# Patient Record
Sex: FEMALE | Race: WHITE | Employment: FULL TIME | URBAN - METROPOLITAN AREA
[De-identification: names, ages, dates, MRNs, and addresses within clinical notes are randomized per-mention and may not be internally consistent; named-entity substitution may affect disease eponyms.]

---

## 2021-04-05 ENCOUNTER — OFFICE VISIT (OUTPATIENT)
Dept: FAMILY MEDICINE CLINIC | Age: 31
End: 2021-04-05
Payer: COMMERCIAL

## 2021-04-05 VITALS
RESPIRATION RATE: 20 BRPM | SYSTOLIC BLOOD PRESSURE: 110 MMHG | HEIGHT: 64 IN | TEMPERATURE: 97.9 F | DIASTOLIC BLOOD PRESSURE: 64 MMHG | BODY MASS INDEX: 24.04 KG/M2 | OXYGEN SATURATION: 99 % | WEIGHT: 140.8 LBS | HEART RATE: 100 BPM

## 2021-04-05 DIAGNOSIS — L40.50 PSORIATIC ARTHRITIS (HCC): ICD-10-CM

## 2021-04-05 DIAGNOSIS — Z76.89 ENCOUNTER TO ESTABLISH CARE: Primary | ICD-10-CM

## 2021-04-05 DIAGNOSIS — G43.009 MIGRAINE WITHOUT AURA AND WITHOUT STATUS MIGRAINOSUS, NOT INTRACTABLE: ICD-10-CM

## 2021-04-05 DIAGNOSIS — L40.9 PSORIASIS: ICD-10-CM

## 2021-04-05 PROBLEM — M76.61 RIGHT ACHILLES TENDINITIS: Status: ACTIVE | Noted: 2021-04-05

## 2021-04-05 PROCEDURE — 99204 OFFICE O/P NEW MOD 45 MIN: CPT | Performed by: NURSE PRACTITIONER

## 2021-04-05 RX ORDER — SECUKINUMAB 150 MG/ML
INJECTION SUBCUTANEOUS
COMMUNITY
Start: 2021-04-01

## 2021-04-05 RX ORDER — FOLIC ACID 1 MG/1
1 TABLET ORAL DAILY
COMMUNITY
Start: 2021-03-08

## 2021-04-05 RX ORDER — BUTALBITAL, ACETAMINOPHEN AND CAFFEINE 50; 325; 40 MG/1; MG/1; MG/1
1 TABLET ORAL EVERY 6 HOURS PRN
Qty: 180 TABLET | Refills: 1 | Status: SHIPPED | OUTPATIENT
Start: 2021-04-05 | End: 2021-06-06 | Stop reason: ALTCHOICE

## 2021-04-05 RX ORDER — RIZATRIPTAN BENZOATE 5 MG/1
5 TABLET ORAL
Qty: 30 TABLET | Refills: 3 | Status: SHIPPED | OUTPATIENT
Start: 2021-04-05 | End: 2021-06-04 | Stop reason: ALTCHOICE

## 2021-04-05 SDOH — ECONOMIC STABILITY: TRANSPORTATION INSECURITY
IN THE PAST 12 MONTHS, HAS LACK OF TRANSPORTATION KEPT YOU FROM MEETINGS, WORK, OR FROM GETTING THINGS NEEDED FOR DAILY LIVING?: NO

## 2021-04-05 SDOH — HEALTH STABILITY: MENTAL HEALTH: HOW OFTEN DO YOU HAVE A DRINK CONTAINING ALCOHOL?: MONTHLY OR LESS

## 2021-04-05 ASSESSMENT — ENCOUNTER SYMPTOMS
ABDOMINAL PAIN: 0
VOMITING: 1
SHORTNESS OF BREATH: 0
NAUSEA: 1
PHOTOPHOBIA: 1
BLURRED VISION: 0
RESPIRATORY NEGATIVE: 1
DIARRHEA: 0
CONSTIPATION: 0
COUGH: 0

## 2021-04-05 ASSESSMENT — PATIENT HEALTH QUESTIONNAIRE - PHQ9
1. LITTLE INTEREST OR PLEASURE IN DOING THINGS: 0
SUM OF ALL RESPONSES TO PHQ QUESTIONS 1-9: 0
SUM OF ALL RESPONSES TO PHQ QUESTIONS 1-9: 0

## 2021-04-05 NOTE — PROGRESS NOTES
Depression screening done  Chief Complaint   Patient presents with    New Patient    Migraine     4-5 times a week

## 2021-04-05 NOTE — PROGRESS NOTES
[facility]  [facility]  [address]  [facility] [address]  Dept: [phone]    4/5/2021    CHIEF COMPLAINT    Chief Complaint   Patient presents with    New Patient    Migraine     4-5 times a week       HPI    Michelle Olson is a 28 y.o. female who presents   Chief Complaint   Patient presents with    New Patient    Migraine     4-5 times a week   . Appointment to establish care. Migraines- She notes that she has 4-5 headaches or migraines per week. She notes that she has taken ibuprofen and Advil migraine have been helpful. Excedrin and tylenol have been ineffective. If it's not she will try to rest.   She has had her wisdom teeth taken out in hopes that this would help. She did this 3-4 months ago with no improvement. Notable history of epidural in 2009. She is unclear when they increased in frequency after her epidural.   Will be pursuing FMLA for migraines. Psoriasis and Psoriatic Arthritis- Seeing [facility]. She is taking methotrexate and Cosentyx     Migraine   This is a chronic problem. The current episode started more than 1 year ago. The problem occurs intermittently. The problem has been gradually worsening. The pain is located in the right unilateral and retro-orbital (at times left temportal is affected, but not often ) region. The pain radiates to the right neck. The quality of the pain is described as throbbing and pulsating. Associated symptoms include nausea, photophobia and vomiting. Pertinent negatives include no abdominal pain, blurred vision, coughing, dizziness, fever or phonophobia. The symptoms are aggravated by emotional stress, bright light and activity. She has tried acetaminophen, darkened room and Excedrin for the symptoms. The treatment provided no relief. Her past medical history is significant for migraine headaches and migraines in the family.          Vitals:    04/05/21 0853   BP: 110/64   Site: Left Upper Arm   Position: Sitting   Cuff Size: Medium Adult   Pulse: 100   Resp: 20   Temp: 97.9 °F (36.6 °C)   TempSrc: Temporal   SpO2: 99%   Weight: 140 lb 12.8 oz (63.9 kg)   Height: 5' 4\" (1.626 m)       Wt Readings from Last 3 Encounters:   04/05/21 140 lb 12.8 oz (63.9 kg)     BP Readings from Last 3 Encounters:   04/05/21 110/64       REVIEW OF SYSTEMS    Review of Systems   Constitutional: Negative. Negative for chills, fatigue and fever. Eyes: Positive for photophobia. Negative for blurred vision. Respiratory: Negative. Negative for cough and shortness of breath. Cardiovascular: Negative. Negative for chest pain and palpitations. Gastrointestinal: Positive for nausea and vomiting. Negative for abdominal pain, constipation and diarrhea. Genitourinary: Negative. Musculoskeletal: Negative. Neurological: Positive for headaches (See HPI ). Negative for dizziness. Psychiatric/Behavioral: Negative. Negative for dysphoric mood. The patient is not nervous/anxious. PAST MEDICAL HISTORY    History reviewed. No pertinent past medical history.     FAMILY HISTORY    Family History   Problem Relation Age of Onset    Cervical Cancer Mother     Anxiety Disorder Mother     Psoriasis Mother     Diabetes type 2  Father    [place] Migraines Father     High Cholesterol Father    [place]go Migraines Sister     Psoriasis Sister     Anxiety Disorder Sister     No Known Problems Brother     Migraines Maternal Grandmother     Heart Surgery Maternal Grandmother         heart valve replacement     Diabetes Maternal Grandmother     Kidney Disease Maternal Grandmother         on HD     Other Maternal Grandmother         DVT in arms     Heart Attack Maternal Grandfather 72        COD     No Known Problems Paternal Grandmother         unknown health history    No Known Problems Paternal Grandfather         unknown health history        SOCIAL HISTORY    Social History     Socioeconomic History    Marital status: Single     Spouse name: None    Number of children: 3    Years of education: None    Highest education level: None   Occupational History    None   Social Needs    Financial resource strain: Not hard at all   Oleg-Lyndsey insecurity     Worry: Never true     Inability: Never true   [employer] needs     Medical: No     Non-medical: No   Tobacco Use    Smoking status: Never Smoker    Smokeless tobacco: Never Used   Substance and Sexual Activity    Alcohol use: Yes     Frequency: Monthly or less     Drinks per session: 1 or 2     Binge frequency: Never    Drug use: Never    Sexual activity: Yes     Partners: Male     Birth control/protection: Surgical   Lifestyle    Physical activity     Days per week: None     Minutes per session: None    Stress: None   Relationships    Social connections     Talks on phone: None     Gets together: None     Attends Yazidism service: None     Active member of club or organization: None     Attends meetings of clubs or organizations: None     Relationship status: None    Intimate partner violence     Fear of current or ex partner: None     Emotionally abused: None     Physically abused: None     Forced sexual activity: None   Other Topics Concern    None   Social History Narrative    None       SURGICAL HISTORY    Past Surgical History:   Procedure Laterality Date    TUBAL LIGATION  2016    WISDOM TOOTH EXTRACTION         CURRENT MEDICATIONS    Current Outpatient Medications   Medication Sig Dispense Refill    folic acid (FOLVITE) 1 MG tablet Take 1 mg by mouth daily      methotrexate (RHEUMATREX) 2.5 MG chemo tablet take 4 tablets by mouth every week      secukinumab (COSENTYX SENSOREADY PEN) 150 MG/ML SOAJ INJECT TWO PENS SUBCUTANEOUSLY EVERY 4 WEEKS. REFRIGERATE. ALLOW 15 TO 30 MINUTES AT ROOM TEMP PRIOR TO ADMINISTRATION.       butalbital-acetaminophen-caffeine (FIORICET, ESGIC) -40 MG per tablet Take 1 tablet by mouth every 6 hours as needed for Headaches or Migraine 180 tablet 1    rizatriptan (MAXALT) 5 MG tablet Take 1 tablet by mouth once as needed for Migraine May repeat in 2 hours if needed. Max dose of 30 mg/daily 30 tablet 3     No current facility-administered medications for this visit. ALLERGIES    Allergies   Allergen Reactions    Sulfamethoxazole-Trimethoprim Hives and Swelling       PHYSICAL EXAM   Physical Exam  Vitals signs and nursing note reviewed. Constitutional:       General: She is not in acute distress. Appearance: She is well-developed. She is not diaphoretic. Interventions: Face mask in place. HENT:      Head: Normocephalic. Right Ear: Hearing normal.      Left Ear: Hearing normal.   Eyes:      General:         Right eye: No discharge. Left eye: No discharge. Pupils: Pupils are equal.   Neck:      Musculoskeletal: Normal range of motion. Cardiovascular:      Rate and Rhythm: Normal rate and regular rhythm. Pulses: Normal pulses. Radial pulses are 2+ on the right side and 2+ on the left side. Heart sounds: Normal heart sounds, S1 normal and S2 normal. No murmur. Pulmonary:      Effort: Pulmonary effort is normal. No respiratory distress. Breath sounds: Normal breath sounds. No wheezing. Skin:     General: Skin is warm and dry. Neurological:      Mental Status: She is alert and oriented to person, place, and time. Psychiatric:         Behavior: Behavior normal. Behavior is cooperative. ASSESSMENT/PLAN  1. Encounter to establish care  2. Psoriasis  Assessment & Plan:   Monitored by specialist- no acute findings meriting change in the plan. Continue taking methotrexate and Cosentyx per rheumatology  3. Psoriatic arthritis ([facility] [address]  Assessment & Plan:   Monitored by specialist- no acute findings meriting change in the plan. Continue taking methotrexate and Cosentyx per rheumatology  4.  Migraine without aura and without status pressure is normal. Treatment plan consists of No treatment change needed. No results found for: LDLCALC, LDLCHOLESTEROL, LDLDIRECT (goal LDL reduction with dx if diabetes is 50% LDL reduction)      PHQ Scores 4/5/2021   PHQ2 Score 0   PHQ9 Score 0     Interpretation of Total Score Depression Severity: 1-4 = Minimal depression, 5-9 = Mild depression, 10-14 = Moderate depression, 15-19 = Moderately severe depression, 20-27 = Severe depression     Return in about 2 months (around 6/5/2021) for migraines.     (Please note that portions of this note were completed with a voice recognition program. Efforts were made to edit the dictations but occasionally words are mis-transcribed.)      Electronically signed by DEMETRA Bustillo CNP on 4/5/21 at 9:04 AM CAROLINET

## 2021-04-23 ENCOUNTER — PATIENT MESSAGE (OUTPATIENT)
Dept: FAMILY MEDICINE CLINIC | Age: 31
End: 2021-04-23

## 2021-04-25 NOTE — ASSESSMENT & PLAN NOTE
Uncontrolled, changes made today: To include Fioricet and Maxalt for abortive therapy. Patient would like to hold off on any daily preventatives at this time. We will also hold off on neurology referral today. Encouraged to stay well-hydrated, avoid triggers and work towards getting restorative sleep.

## 2021-04-25 NOTE — ASSESSMENT & PLAN NOTE
Monitored by specialist- no acute findings meriting change in the plan.   Continue taking methotrexate and Cosentyx per rheumatology

## 2021-06-04 ENCOUNTER — OFFICE VISIT (OUTPATIENT)
Dept: FAMILY MEDICINE CLINIC | Age: 31
End: 2021-06-04
Payer: COMMERCIAL

## 2021-06-04 VITALS
HEART RATE: 79 BPM | OXYGEN SATURATION: 98 % | SYSTOLIC BLOOD PRESSURE: 114 MMHG | TEMPERATURE: 97.5 F | HEIGHT: 64 IN | DIASTOLIC BLOOD PRESSURE: 76 MMHG | RESPIRATION RATE: 18 BRPM | WEIGHT: 139 LBS | BODY MASS INDEX: 23.73 KG/M2

## 2021-06-04 DIAGNOSIS — G43.009 MIGRAINE WITHOUT AURA AND WITHOUT STATUS MIGRAINOSUS, NOT INTRACTABLE: Primary | ICD-10-CM

## 2021-06-04 PROCEDURE — 99213 OFFICE O/P EST LOW 20 MIN: CPT | Performed by: NURSE PRACTITIONER

## 2021-06-04 RX ORDER — RIZATRIPTAN BENZOATE 10 MG/1
10 TABLET ORAL
Qty: 30 TABLET | Refills: 3 | Status: SHIPPED | OUTPATIENT
Start: 2021-06-04 | End: 2021-10-19 | Stop reason: ALTCHOICE

## 2021-06-04 RX ORDER — PROPRANOLOL HYDROCHLORIDE 40 MG/1
40 TABLET ORAL 2 TIMES DAILY
Qty: 60 TABLET | Refills: 3 | Status: SHIPPED | OUTPATIENT
Start: 2021-06-04 | End: 2021-09-09 | Stop reason: SDUPTHER

## 2021-06-04 ASSESSMENT — PATIENT HEALTH QUESTIONNAIRE - PHQ9
SUM OF ALL RESPONSES TO PHQ9 QUESTIONS 1 & 2: 0
SUM OF ALL RESPONSES TO PHQ QUESTIONS 1-9: 0
1. LITTLE INTEREST OR PLEASURE IN DOING THINGS: 0
SUM OF ALL RESPONSES TO PHQ QUESTIONS 1-9: 0
2. FEELING DOWN, DEPRESSED OR HOPELESS: 0
SUM OF ALL RESPONSES TO PHQ QUESTIONS 1-9: 0

## 2021-06-04 ASSESSMENT — ENCOUNTER SYMPTOMS
VOMITING: 1
BLURRED VISION: 0
PHOTOPHOBIA: 1
NAUSEA: 1
ABDOMINAL PAIN: 0
COUGH: 0

## 2021-06-04 NOTE — PROGRESS NOTES
Maxalt ), fever or phonophobia. The symptoms are aggravated by emotional stress, bright light and activity. She has tried acetaminophen, darkened room and Excedrin for the symptoms. The treatment provided no relief. Her past medical history is significant for migraine headaches and migraines in the family. Vitals:    06/04/21 1131   BP: 114/76   Site: Left Upper Arm   Position: Sitting   Cuff Size: Medium Adult   Pulse: 79   Resp: 18   Temp: 97.5 °F (36.4 °C)   TempSrc: Temporal   SpO2: 98%   Weight: 139 lb (63 kg)   Height: 5' 4\" (1.626 m)       Wt Readings from Last 3 Encounters:   06/04/21 139 lb (63 kg)   04/05/21 140 lb 12.8 oz (63.9 kg)     BP Readings from Last 3 Encounters:   06/04/21 114/76   04/05/21 110/64       REVIEW OF SYSTEMS    Review of Systems   Constitutional: Negative. Negative for chills, fatigue and fever. Eyes: Positive for photophobia. Negative for blurred vision. Respiratory: Negative. Negative for cough and shortness of breath. Cardiovascular: Negative. Negative for chest pain and palpitations. Gastrointestinal: Positive for nausea and vomiting. Negative for abdominal pain, constipation and diarrhea. Genitourinary: Negative. Musculoskeletal: Negative. Neurological: Negative. Negative for dizziness ( With Maxalt ) and headaches. Psychiatric/Behavioral: Negative. Negative for dysphoric mood. The patient is not nervous/anxious. PAST MEDICAL HISTORY    No past medical history on file.     FAMILY HISTORY    Family History   Problem Relation Age of Onset    Cervical Cancer Mother     Anxiety Disorder Mother     Psoriasis Mother     Diabetes type 2  Father     Migraines Father     High Cholesterol Father    Orest Adama Migraines Sister     Psoriasis Sister     Anxiety Disorder Sister     No Known Problems Brother     Migraines Maternal Grandmother     Heart Surgery Maternal Grandmother         heart valve replacement     Diabetes Maternal Grandmother     LIGATION  2016    WISDOM TOOTH EXTRACTION         CURRENT MEDICATIONS    Current Outpatient Medications   Medication Sig Dispense Refill    rizatriptan (MAXALT) 10 MG tablet Take 1 tablet by mouth once as needed for Migraine May repeat in 2 hours if needed. Maximum of 30 mg/24 hours 30 tablet 3    propranolol (INDERAL) 40 MG tablet Take 1 tablet by mouth 2 times daily 60 tablet 3    folic acid (FOLVITE) 1 MG tablet Take 1 mg by mouth daily      methotrexate (RHEUMATREX) 2.5 MG chemo tablet take 4 tablets by mouth every week      secukinumab (COSENTYX SENSOREADY PEN) 150 MG/ML SOAJ INJECT TWO PENS SUBCUTANEOUSLY EVERY 4 WEEKS. REFRIGERATE. ALLOW 15 TO 30 MINUTES AT ROOM TEMP PRIOR TO ADMINISTRATION. No current facility-administered medications for this visit. ALLERGIES    Allergies   Allergen Reactions    Sulfamethoxazole-Trimethoprim Hives and Swelling       PHYSICAL EXAM   Physical Exam  Vitals and nursing note reviewed. Constitutional:       General: She is not in acute distress. Appearance: She is well-developed. She is not diaphoretic. Interventions: Face mask in place. HENT:      Head: Normocephalic. Right Ear: Hearing normal.      Left Ear: Hearing normal.   Eyes:      General:         Right eye: No discharge. Left eye: No discharge. Pupils: Pupils are equal.   Cardiovascular:      Rate and Rhythm: Normal rate and regular rhythm. Pulses: Normal pulses. Radial pulses are 2+ on the right side and 2+ on the left side. Heart sounds: Normal heart sounds, S1 normal and S2 normal. No murmur heard. Pulmonary:      Effort: Pulmonary effort is normal. No respiratory distress. Breath sounds: Normal breath sounds. No wheezing. Musculoskeletal:      Cervical back: Normal range of motion. Skin:     General: Skin is warm and dry. Neurological:      Mental Status: She is alert and oriented to person, place, and time.    Psychiatric: Behavior: Behavior normal. Behavior is cooperative. ASSESSMENT/PLAN  1. Migraine without aura and without status migrainosus, not intractable  Assessment & Plan:   Uncontrolled, changes made today: Continue Fioricet, add Inderal 40 mg daily with plans to titrate up to 40 mg twice daily and increase Maxalt from 5 mg at onset to 10 mg at onset with maximum of 30 mg in 24 hours. Headache diary provided to patient. Discussed importance of monitoring for triggers and avoiding them when possible. Extensive discussion about medications, side effects and possible need to adjust medications to find the right combination. Orders:  -     rizatriptan (MAXALT) 10 MG tablet; Take 1 tablet by mouth once as needed for Migraine May repeat in 2 hours if needed. Maximum of 30 mg/24 hours, Disp-30 tablet, R-3Normal  -     propranolol (INDERAL) 40 MG tablet; Take 1 tablet by mouth 2 times daily, Disp-60 tablet, R-3Normal       I have spent 20 minutes face to face with the patient more than 50 % if this time was spent counseling and coordinating care. Kaela received counseling on the following healthy behaviors: nutrition, exercise and medication adherence  Reviewed prior labs and health maintenance  Continue current medications, diet and exercise. Discussed use, benefit, and side effects of prescribed medications. Barriers to medication compliance addressed. Patient given educational materials - see patient instructions  Was a self-tracking handout given in paper form or via [employer]? Yes    Requested Prescriptions     Signed Prescriptions Disp Refills    rizatriptan (MAXALT) 10 MG tablet 30 tablet 3     Sig: Take 1 tablet by mouth once as needed for Migraine May repeat in 2 hours if needed. Maximum of 30 mg/24 hours    propranolol (INDERAL) 40 MG tablet 60 tablet 3     Sig: Take 1 tablet by mouth 2 times daily       All patient questions answered. Patient voiced understanding.     Quality Measures    Body mass index is 23.86 kg/m². Normal. Weight control planned discussed Healthy diet and regular exercise. BP: 114/76 Blood pressure is normal. Treatment plan consists of No treatment change needed. No results found for: LDLCALC, LDLCHOLESTEROL, LDLDIRECT (goal LDL reduction with dx if diabetes is 50% LDL reduction)      PHQ Scores 6/4/2021 4/5/2021   PHQ2 Score 0 0   PHQ9 Score 0 0     Interpretation of Total Score Depression Severity: 1-4 = Minimal depression, 5-9 = Mild depression, 10-14 = Moderate depression, 15-19 = Moderately severe depression, 20-27 = Severe depression     Return in about 7 weeks (around 7/23/2021) for migraines .     (Please note that portions of this note were completed with a voice recognition program. Efforts were made to edit the dictations but occasionally words are mis-transcribed.)      Electronically signed by DEMETRA Camacho CNP on 6/4/21 at 11:35 AM LEIDY

## 2021-06-06 ASSESSMENT — ENCOUNTER SYMPTOMS
RESPIRATORY NEGATIVE: 1
SHORTNESS OF BREATH: 0
DIARRHEA: 0
CONSTIPATION: 0

## 2021-06-06 NOTE — ASSESSMENT & PLAN NOTE
Uncontrolled, changes made today: Continue Fioricet, add Inderal 40 mg daily with plans to titrate up to 40 mg twice daily and increase Maxalt from 5 mg at onset to 10 mg at onset with maximum of 30 mg in 24 hours. Headache diary provided to patient. Discussed importance of monitoring for triggers and avoiding them when possible. Extensive discussion about medications, side effects and possible need to adjust medications to find the right combination.

## 2021-09-09 DIAGNOSIS — G43.009 MIGRAINE WITHOUT AURA AND WITHOUT STATUS MIGRAINOSUS, NOT INTRACTABLE: ICD-10-CM

## 2021-09-09 RX ORDER — PROPRANOLOL HYDROCHLORIDE 40 MG/1
40 TABLET ORAL 2 TIMES DAILY
Qty: 60 TABLET | Refills: 3 | Status: SHIPPED | OUTPATIENT
Start: 2021-09-09 | End: 2021-10-19

## 2021-10-19 ENCOUNTER — OFFICE VISIT (OUTPATIENT)
Dept: FAMILY MEDICINE CLINIC | Age: 31
End: 2021-10-19
Payer: COMMERCIAL

## 2021-10-19 VITALS
DIASTOLIC BLOOD PRESSURE: 60 MMHG | SYSTOLIC BLOOD PRESSURE: 88 MMHG | HEART RATE: 83 BPM | TEMPERATURE: 97 F | WEIGHT: 147.6 LBS | HEIGHT: 63 IN | BODY MASS INDEX: 26.15 KG/M2 | OXYGEN SATURATION: 98 %

## 2021-10-19 DIAGNOSIS — G43.009 MIGRAINE WITHOUT AURA AND WITHOUT STATUS MIGRAINOSUS, NOT INTRACTABLE: Primary | ICD-10-CM

## 2021-10-19 DIAGNOSIS — L40.50 PSORIATIC ARTHRITIS (HCC): ICD-10-CM

## 2021-10-19 DIAGNOSIS — L40.9 PSORIASIS: ICD-10-CM

## 2021-10-19 PROCEDURE — 99214 OFFICE O/P EST MOD 30 MIN: CPT | Performed by: NURSE PRACTITIONER

## 2021-10-19 RX ORDER — RIMEGEPANT SULFATE 75 MG/75MG
75 TABLET, ORALLY DISINTEGRATING ORAL PRN
Qty: 30 TABLET | Refills: 2 | Status: SHIPPED | OUTPATIENT
Start: 2021-10-19 | End: 2022-01-19 | Stop reason: SDUPTHER

## 2021-10-19 RX ORDER — RIMEGEPANT SULFATE 75 MG/75MG
75 TABLET, ORALLY DISINTEGRATING ORAL PRN
Qty: 30 TABLET | Refills: 2 | Status: SHIPPED | OUTPATIENT
Start: 2021-10-19 | End: 2021-10-19

## 2021-10-19 RX ORDER — RIMEGEPANT SULFATE 75 MG/75MG
75 TABLET, ORALLY DISINTEGRATING ORAL PRN
Qty: 30 TABLET | Refills: 2
Start: 2021-10-19 | End: 2022-01-19

## 2021-10-19 RX ORDER — PROPRANOLOL HYDROCHLORIDE 40 MG/1
20 TABLET ORAL DAILY
Qty: 60 TABLET | Refills: 3
Start: 2021-10-19 | End: 2022-01-19 | Stop reason: ALTCHOICE

## 2021-10-19 ASSESSMENT — ENCOUNTER SYMPTOMS
CONSTIPATION: 0
DIARRHEA: 0
VOMITING: 1
ABDOMINAL PAIN: 0
RESPIRATORY NEGATIVE: 1
SHORTNESS OF BREATH: 0
PHOTOPHOBIA: 1
NAUSEA: 1
BLURRED VISION: 0
COUGH: 0

## 2021-10-19 NOTE — ASSESSMENT & PLAN NOTE
Monitored by specialist- no acute findings meriting change in the plan.   Continue taking Cosentyx per rheumatology and establish with neurology as planned per their office request

## 2021-10-19 NOTE — ASSESSMENT & PLAN NOTE
Uncontrolled, changes made today: reduce Inderal to 20 mg from 40 daily due to fatigue and hypotension. Will d/c completely if fatigue continues or does not improve. Replace Maxalt with Nurtec ODT. Rx sent to pharmacy, [facility] and sample provided . Will fax copies of office notes to new neurology once patient provides name of provider.

## 2021-10-19 NOTE — PROGRESS NOTES
[facility]  [facility]  [address]  [facility] [address]  Dept: [phone]    10/19/2021    CHIEF COMPLAINT    Chief Complaint   Patient presents with    Migraine       HPI    Linda Meza is a 28 y.o. female who presents   Chief Complaint   Patient presents with    Migraine   . Appointment for follow-up on migraines. Migraines-at last appointment Inderal 40 mg was added with plans to titrate up to 40 mg twice daily. Maxalt was increased to 10 mg at onset with max of 30 in 24 hours. Patient provided headache diary to document history. TODAY- today noting headaches or migraines per week that are occurring roughly 2-3 times per week. She is feeling extra tired since starting the inderal. She continues only taking the Inderal once daily due to forgetting to take the second dose. She does have a BP of 88/60 in the office today. She feels that she is getting more headaches when she drinks more water. She is hardly drinking any water per day at this time for this reason. At work she drinks more water. Maxalt increase helped 50 % of the time, but she needed to be able to nap after to provide improvement in her symptoms. She had previously experienced dizziness that was improved with taking the medication with food.      Migraine history:  She tried the fioricet which was ineffective. She notes that she has taken ibuprofen and Advil migraine have been helpful. Excedrin and tylenol have been ineffective. She has had her wisdom teeth taken out in hopes that this would help. She did this 3-4 months ago with no improvement. Notable history of epidural in 2009. She is unclear when they increased in frequency after her epidural.   Will be pursuing FMLA for migraines.      History of psoriatic arthritis- Seeing Dr. Mady Sam at [facility] She was previously seen by  Seeing Dr. Young Chaidez with [facility] rheumatology.    She continues taking folic acid 1 mg daily and Cosentyx 300 mg monthly. Weaned methotrexate 6 weeks ago without any incident. Seeing neurology per rheumatology due to her medications, headaches and strength of the Cosentyx.        Migraine   This is a chronic problem. The current episode started more than 1 year ago. The problem occurs intermittently. The problem has been gradually worsening. The pain is located in the right unilateral and retro-orbital (at times left temportal is affected, but not often ) region. The pain radiates to the right neck. The quality of the pain is described as throbbing and pulsating. Associated symptoms include nausea, photophobia and vomiting. Pertinent negatives include no abdominal pain, blurred vision, coughing, dizziness ( With Maxalt ), fever or phonophobia. The symptoms are aggravated by emotional stress, bright light and activity. She has tried acetaminophen, darkened room and Excedrin for the symptoms. The treatment provided no relief. Her past medical history is significant for migraine headaches and migraines in the family. Vitals:    10/19/21 1533   BP: 88/60   Pulse: 83   Temp: 97 °F (36.1 °C)   SpO2: 98%   Weight: 147 lb 9.6 oz (67 kg)   Height: 5' 3\" (1.6 m)       Wt Readings from Last 3 Encounters:   10/19/21 147 lb 9.6 oz (67 kg)   06/04/21 139 lb (63 kg)   04/05/21 140 lb 12.8 oz (63.9 kg)     BP Readings from Last 3 Encounters:   10/19/21 88/60   06/04/21 114/76   04/05/21 110/64       REVIEW OF SYSTEMS    Review of Systems   Constitutional: Negative. Negative for chills, fatigue and fever. Eyes: Positive for photophobia. Negative for blurred vision. Respiratory: Negative. Negative for cough and shortness of breath. Cardiovascular: Negative. Negative for chest pain and palpitations. Gastrointestinal: Positive for nausea and vomiting. Negative for abdominal pain, constipation and diarrhea. Genitourinary: Negative. Musculoskeletal: Negative.     Neurological: Negative. Negative for dizziness ( With Maxalt ) and headaches. Psychiatric/Behavioral: Negative. Negative for dysphoric mood. The patient is not nervous/anxious. PAST MEDICAL HISTORY    No past medical history on file. FAMILY HISTORY    Family History   Problem Relation Age of Onset    Cervical Cancer Mother     Anxiety Disorder Mother     Psoriasis Mother     Diabetes type 2  Father    Jesusita Kohli Migraines Father     High Cholesterol Father    Jesusita Kohli Migraines Sister     Psoriasis Sister     Anxiety Disorder Sister     No Known Problems Brother     Migraines Maternal Grandmother     Heart Surgery Maternal Grandmother         heart valve replacement     Diabetes Maternal Grandmother     Kidney Disease Maternal Grandmother         on HD     Other Maternal Grandmother         DVT in arms     Heart Attack Maternal Grandfather 67        COD     No Known Problems Paternal Grandmother         unknown health history    No Known Problems Paternal Grandfather         unknown health history        SOCIAL HISTORY    Social History     Socioeconomic History    Marital status: Single     Spouse name: None    Number of children: 3    Years of education: None    Highest education level: None   Occupational History    None   Tobacco Use    Smoking status: Never Smoker    Smokeless tobacco: Never Used   Vaping Use    Vaping Use: Never used   Substance and Sexual Activity    Alcohol use:  Yes    Drug use: Never    Sexual activity: Yes     Partners: Male     Birth control/protection: Surgical   Other Topics Concern    None   Social History Narrative    None     Social Determinants of Health     Financial Resource Strain: Low Risk     Difficulty of Paying Living Expenses: Not hard at all   Food Insecurity: No Food Insecurity    Worried About Running Out of Food in the Last Year: Never true    Dana of Food in the Last Year: Never true   Transportation Needs: No Transportation Needs    Lack of Transportation (Medical): No    Lack of Transportation (Non-Medical): No   Physical Activity:     Days of Exercise per Week:     Minutes of Exercise per Session:    Stress:     Feeling of Stress :    Social Connections:     Frequency of Communication with Friends and Family:     Frequency of Social Gatherings with Friends and Family:     Attends Voodoo Services:     Active Member of Clubs or Organizations:     Attends Club or Organization Meetings:     Marital Status:    Intimate Partner Violence:     Fear of Current or Ex-Partner:     Emotionally Abused:     Physically Abused:     Sexually Abused:        SURGICAL HISTORY    Past Surgical History:   Procedure Laterality Date    TUBAL LIGATION  2016    WISDOM TOOTH EXTRACTION         CURRENT MEDICATIONS    Current Outpatient Medications   Medication Sig Dispense Refill    Rimegepant Sulfate (NURTEC) 75 MG TBDP Take 75 mg by mouth as needed (Migraines) maximum: 75 mg/24 hours. 30 tablet 2    Rimegepant Sulfate (NURTEC) 75 MG TBDP Take 75 mg by mouth as needed (Migraines) maximum: 75 mg/24 hours. Lot Number [id number], Exp Date 10/01/2022, [address] Number [id number] 30 tablet 2    propranolol (INDERAL) 40 MG tablet Take 1 tablet by mouth 2 times daily 60 tablet 3    folic acid (FOLVITE) 1 MG tablet Take 1 mg by mouth daily      secukinumab (COSENTYX SENSOREADY PEN) 150 MG/ML SOAJ INJECT TWO PENS SUBCUTANEOUSLY EVERY 4 WEEKS. REFRIGERATE. ALLOW 15 TO 30 MINUTES AT ROOM TEMP PRIOR TO ADMINISTRATION. No current facility-administered medications for this visit. ALLERGIES    Allergies   Allergen Reactions    Sulfamethoxazole-Trimethoprim Hives and Swelling       PHYSICAL EXAM   Physical Exam  Vitals and nursing note reviewed. Constitutional:       General: She is not in acute distress. Appearance: She is well-developed. She is not diaphoretic. Interventions: Face mask in place. HENT:      Head: Normocephalic.       Right Ear: Hearing normal.      Left Ear: Hearing normal.   Eyes:      General:         Right eye: No discharge. Left eye: No discharge. Pupils: Pupils are equal.   Cardiovascular:      Rate and Rhythm: Normal rate and regular rhythm. Pulses: Normal pulses. Radial pulses are 2+ on the right side and 2+ on the left side. Heart sounds: Normal heart sounds, S1 normal and S2 normal. No murmur heard. Pulmonary:      Effort: Pulmonary effort is normal. No respiratory distress. Breath sounds: Normal breath sounds. No wheezing. Musculoskeletal:      Cervical back: Normal range of motion. Skin:     General: Skin is warm and dry. Neurological:      Mental Status: She is alert and oriented to person, place, and time. Psychiatric:         Behavior: Behavior normal. Behavior is cooperative. ASSESSMENT/PLAN  1. Migraine without aura and without status migrainosus, not intractable  Assessment & Plan:   Uncontrolled, changes made today: reduce Inderal to 20 mg from 40 daily due to fatigue and hypotension. Will d/c completely if fatigue continues or does not improve. Replace Maxalt with Nurtec ODT. Rx sent to pharmacy, [facility] and sample provided . Will fax copies of office notes to new neurology once patient provides name of provider. Orders:  -     Rimegepant Sulfate (NURTEC) 75 MG TBDP; Take 75 mg by mouth as needed (Migraines) maximum: 75 mg/24 hours. , Disp-30 tablet, R-2Normal  -     Rimegepant Sulfate (NURTEC) 75 MG TBDP; Take 75 mg by mouth as needed (Migraines) maximum: 75 mg/24 hours. Lot Number [id number], Exp Date 10/01/2022, [facility] Number [address]-4, Disp-30 tablet, R-2NO PRINT  2. Psoriatic arthritis ([facility] [address]  Assessment & Plan:   Monitored by specialist- no acute findings meriting change in the plan. Continue taking Cosentyx per rheumatology and establish with neurology as planned per their office request   3.  Psoriasis  Assessment & Plan:   Monitored by specialist- no acute findings meriting change in the plan. Continue taking Cosentyx per rheumatology and establish with neurology as planned per their office request        Ana Maria Turpin received counseling on the following healthy behaviors: nutrition, exercise and medication adherence  Reviewed prior labs and health maintenance  Continue current medications, diet and exercise. Discussed use, benefit, and side effects of prescribed medications. Barriers to medication compliance addressed. Patient given educational materials - see patient instructions  Was a self-tracking handout given in paper form or via [employer]? Yes    Requested Prescriptions     Signed Prescriptions Disp Refills    Rimegepant Sulfate (NURTEC) 75 MG TBDP 30 tablet 2     Sig: Take 75 mg by mouth as needed (Migraines) maximum: 75 mg/24 hours.  Rimegepant Sulfate (NURTEC) 75 MG TBDP 30 tablet 2     Sig: Take 75 mg by mouth as needed (Migraines) maximum: 75 mg/24 hours. Lot Number [id number], Exp Date 10/01/2022, [facility] Number [id number]    propranolol (INDERAL) 40 MG tablet 60 tablet 3     Sig: Take 0.5 tablets by mouth daily       All patient questions answered. Patient voiced understanding. Quality Measures    Body mass index is 26.15 kg/m². Normal. Weight control planned discussed Healthy diet and regular exercise. BP: 88/60 Blood pressure is low. Treatment plan consists of See above. No results found for: LDLCALC, LDLCHOLESTEROL, LDLDIRECT (goal LDL reduction with dx if diabetes is 50% LDL reduction)      PHQ Scores 6/4/2021 4/5/2021   PHQ2 Score 0 0   PHQ9 Score 0 0     Interpretation of Total Score Depression Severity: 1-4 = Minimal depression, 5-9 = Mild depression, 10-14 = Moderate depression, 15-19 = Moderately severe depression, 20-27 = Severe depression     Return in about 3 months (around 1/19/2022) for Migraines .     (Please note that portions of this note were completed with a voice recognition program. Efforts were made to edit the dictations but occasionally words are mis-transcribed.)      Electronically signed by DEMETRA Gramajo CNP on 10/19/21 at 3:52 PM EDT

## 2021-12-30 ENCOUNTER — TELEPHONE (OUTPATIENT)
Dept: FAMILY MEDICINE CLINIC | Age: 31
End: 2021-12-30

## 2021-12-30 DIAGNOSIS — U07.1 COVID-19: Primary | ICD-10-CM

## 2021-12-30 DIAGNOSIS — R05.9 COUGH: ICD-10-CM

## 2021-12-30 DIAGNOSIS — R11.0 NAUSEA: ICD-10-CM

## 2021-12-30 RX ORDER — AZITHROMYCIN 250 MG/1
250 TABLET, FILM COATED ORAL SEE ADMIN INSTRUCTIONS
Qty: 6 TABLET | Refills: 0 | Status: SHIPPED | OUTPATIENT
Start: 2021-12-30 | End: 2022-01-04

## 2021-12-30 RX ORDER — ONDANSETRON 4 MG/1
4 TABLET, ORALLY DISINTEGRATING ORAL 3 TIMES DAILY PRN
Qty: 21 TABLET | Refills: 0 | Status: SHIPPED | OUTPATIENT
Start: 2021-12-30 | End: 2022-01-19

## 2021-12-30 RX ORDER — RIZATRIPTAN BENZOATE 10 MG/1
TABLET ORAL
COMMUNITY
Start: 2021-12-07 | End: 2022-01-19

## 2021-12-30 RX ORDER — GUAIFENESIN AND CODEINE PHOSPHATE 100; 10 MG/5ML; MG/5ML
5 SOLUTION ORAL EVERY 4 HOURS PRN
Qty: 180 ML | Refills: 0 | Status: SHIPPED | OUTPATIENT
Start: 2021-12-30 | End: 2022-01-06

## 2021-12-30 RX ORDER — BENZONATATE 200 MG/1
200 CAPSULE ORAL 3 TIMES DAILY PRN
Qty: 30 CAPSULE | Refills: 1 | Status: SHIPPED | OUTPATIENT
Start: 2021-12-30 | End: 2022-01-19

## 2021-12-30 NOTE — TELEPHONE ENCOUNTER
Rx's sent. Verified with Svetlana Boudreaux.   Patient does not want antibodies due to fear of needles

## 2021-12-30 NOTE — TELEPHONE ENCOUNTER
Pt s/o body aches, fever, cough congestion, headache did test positive on 12/29/2021 for covid.  Pt is asking that a medication is sent to the pharmacy she was not sure what pt stated her mom talked to Ish[place] today

## 2022-01-19 ENCOUNTER — OFFICE VISIT (OUTPATIENT)
Dept: FAMILY MEDICINE CLINIC | Age: 32
End: 2022-01-19
Payer: COMMERCIAL

## 2022-01-19 VITALS
OXYGEN SATURATION: 99 % | WEIGHT: 140.2 LBS | RESPIRATION RATE: 16 BRPM | SYSTOLIC BLOOD PRESSURE: 102 MMHG | HEART RATE: 88 BPM | HEIGHT: 63 IN | TEMPERATURE: 97.4 F | BODY MASS INDEX: 24.84 KG/M2 | DIASTOLIC BLOOD PRESSURE: 68 MMHG

## 2022-01-19 DIAGNOSIS — G43.009 MIGRAINE WITHOUT AURA AND WITHOUT STATUS MIGRAINOSUS, NOT INTRACTABLE: ICD-10-CM

## 2022-01-19 DIAGNOSIS — U07.1 COVID-19: Primary | ICD-10-CM

## 2022-01-19 PROCEDURE — 99213 OFFICE O/P EST LOW 20 MIN: CPT | Performed by: NURSE PRACTITIONER

## 2022-01-19 RX ORDER — TRIAMCINOLONE ACETONIDE 5 MG/G
CREAM TOPICAL 2 TIMES DAILY
COMMUNITY
Start: 2022-01-10

## 2022-01-19 RX ORDER — RIMEGEPANT SULFATE 75 MG/75MG
75 TABLET, ORALLY DISINTEGRATING ORAL EVERY OTHER DAY
Qty: 16 TABLET | Refills: 5 | Status: SHIPPED | OUTPATIENT
Start: 2022-01-19

## 2022-01-19 SDOH — ECONOMIC STABILITY: FOOD INSECURITY: WITHIN THE PAST 12 MONTHS, YOU WORRIED THAT YOUR FOOD WOULD RUN OUT BEFORE YOU GOT MONEY TO BUY MORE.: NEVER TRUE

## 2022-01-19 SDOH — ECONOMIC STABILITY: TRANSPORTATION INSECURITY
IN THE PAST 12 MONTHS, HAS THE LACK OF TRANSPORTATION KEPT YOU FROM MEDICAL APPOINTMENTS OR FROM GETTING MEDICATIONS?: NO

## 2022-01-19 SDOH — ECONOMIC STABILITY: FOOD INSECURITY: WITHIN THE PAST 12 MONTHS, THE FOOD YOU BOUGHT JUST DIDN'T LAST AND YOU DIDN'T HAVE MONEY TO GET MORE.: NEVER TRUE

## 2022-01-19 ASSESSMENT — PATIENT HEALTH QUESTIONNAIRE - PHQ9
SUM OF ALL RESPONSES TO PHQ QUESTIONS 1-9: 0
1. LITTLE INTEREST OR PLEASURE IN DOING THINGS: 0
SUM OF ALL RESPONSES TO PHQ QUESTIONS 1-9: 0
SUM OF ALL RESPONSES TO PHQ QUESTIONS 1-9: 0
2. FEELING DOWN, DEPRESSED OR HOPELESS: 0
SUM OF ALL RESPONSES TO PHQ9 QUESTIONS 1 & 2: 0
SUM OF ALL RESPONSES TO PHQ QUESTIONS 1-9: 0

## 2022-01-19 ASSESSMENT — ENCOUNTER SYMPTOMS
ABDOMINAL PAIN: 0
DIARRHEA: 0
RESPIRATORY NEGATIVE: 1
VOMITING: 1
COUGH: 0
SHORTNESS OF BREATH: 0
CONSTIPATION: 0
NAUSEA: 1
PHOTOPHOBIA: 1

## 2022-01-19 ASSESSMENT — SOCIAL DETERMINANTS OF HEALTH (SDOH): HOW HARD IS IT FOR YOU TO PAY FOR THE VERY BASICS LIKE FOOD, HOUSING, MEDICAL CARE, AND HEATING?: NOT HARD AT ALL

## 2022-01-19 NOTE — ASSESSMENT & PLAN NOTE
At goal, Continue drinking plenty of fluids as able, rest as needed and monitor if increased HA are related to fatigue.

## 2022-01-19 NOTE — PROGRESS NOTES
Depression screening done  Financial resource strain done  Chief Complaint   Patient presents with    Migraine    Positive For Covid-19

## 2022-01-19 NOTE — PROGRESS NOTES
[facility] [address] [address]  Dept: [phone]    1/19/2022    CHIEF COMPLAINT    Chief Complaint   Patient presents with    Migraine    Positive For Covid-19       HPI    Rohini Herman is a 28 y.o. female who presents   Chief Complaint   Patient presents with    Migraine    Positive For Covid-19     Appointment to discuss post COVID sx. COVID 19- positive for COVID on 12/29/2021. She took a PCR test on 1/3/2022. She has been off since that time and would like to return to work. She is noting continued headaches that are occurring around 5 pm. Lingering fatigue is noted as well. Initially sx were: body aches, nausea, headaches, fevers, sore throat and coughing. She notes alterations I her taste, but not absence in taste or smell. She pushed fluid, rested and self isolated. Migraines- stopped inderal due to fatigue. She is taking Nurtec a couple times per week with good effectiveness and no side effects. She is currently taking PRN not for prevention, but they are supplying her with 16 per month. Vitals:    01/19/22 1055   BP: 102/68   Site: Left Upper Arm   Position: Sitting   Cuff Size: Large Adult   Pulse: 88   Resp: 16   Temp: 97.4 °F (36.3 °C)   TempSrc: Temporal   SpO2: 99%   Weight: 140 lb 3.2 oz (63.6 kg)   Height: 5' 3\" (1.6 m)       Wt Readings from Last 3 Encounters:   01/19/22 140 lb 3.2 oz (63.6 kg)   10/19/21 147 lb 9.6 oz (67 kg)   06/04/21 139 lb (63 kg)     BP Readings from Last 3 Encounters:   01/19/22 102/68   10/19/21 88/60   06/04/21 114/76       REVIEW OF SYSTEMS    Review of Systems   Constitutional: Negative. Negative for chills, fatigue and fever. Eyes: Positive for photophobia. Respiratory: Negative. Negative for cough and shortness of breath. Cardiovascular: Negative. Negative for chest pain and palpitations.    Gastrointestinal: Positive for nausea and vomiting. Negative for abdominal pain, constipation and diarrhea. Genitourinary: Negative. Musculoskeletal: Negative. Neurological: Negative. Negative for dizziness and headaches. Psychiatric/Behavioral: Negative. Negative for dysphoric mood. The patient is not nervous/anxious. PAST MEDICAL HISTORY    No past medical history on file. FAMILY HISTORY    Family History   Problem Relation Age of Onset    Cervical Cancer Mother     Anxiety Disorder Mother     Psoriasis Mother     Diabetes type 2  Father    Falguni Martinez Migraines Father     High Cholesterol Father    Falgunirivera Herringell Migraines Sister     Psoriasis Sister     Anxiety Disorder Sister     No Known Problems Brother     Migraines Maternal Grandmother     Heart Surgery Maternal Grandmother         heart valve replacement     Diabetes Maternal Grandmother     Kidney Disease Maternal Grandmother         on HD     Other Maternal Grandmother         DVT in arms     Heart Attack Maternal Grandfather 67        COD     No Known Problems Paternal Grandmother         unknown health history    No Known Problems Paternal Grandfather         unknown health history        SOCIAL HISTORY    Social History     Socioeconomic History    Marital status: Single     Spouse name: None    Number of children: 3    Years of education: None    Highest education level: None   Occupational History    None   Tobacco Use    Smoking status: Never Smoker    Smokeless tobacco: Never Used   Vaping Use    Vaping Use: Never used   Substance and Sexual Activity    Alcohol use:  Yes    Drug use: Never    Sexual activity: Yes     Partners: Male     Birth control/protection: Surgical   Other Topics Concern    None   Social History Narrative    None     Social Determinants of Health     Financial Resource Strain: Low Risk     Difficulty of Paying Living Expenses: Not hard at all   Food Insecurity: No Food Insecurity    Worried About [address] in the Last Year: Never true    Ran Out of Food in the Last Year: Never true   Transportation Needs: No Transportation Needs    Lack of Transportation (Medical): No    Lack of Transportation (Non-Medical): No   Physical Activity:     Days of Exercise per Week: Not on file    Minutes of Exercise per Session: Not on file   Stress:     Feeling of Stress : Not on file   Social Connections:     Frequency of Communication with Friends and Family: Not on file    Frequency of Social Gatherings with Friends and Family: Not on file    Attends Church Services: Not on file    Active Member of [address] [place] [address] [employer] or Organizations: Not on file    Attends Club or Organization Meetings: Not on file    Marital Status: Not on file   Intimate Partner Violence:     Fear of Current or Ex-Partner: Not on file    Emotionally Abused: Not on file    Physically Abused: Not on file    Sexually Abused: Not on file   Housing Stability:     Unable to Pay for Housing in the Last Year: Not on file    Number of [place] in the Last Year: Not on file    Unstable Housing in the Last Year: Not on file       SURGICAL HISTORY    Past Surgical History:   Procedure Laterality Date    TUBAL LIGATION  2016    WISDOM TOOTH EXTRACTION         CURRENT MEDICATIONS    Current Outpatient Medications   Medication Sig Dispense Refill    triamcinolone (ARISTOCORT) 0.5 % cream Apply topically 2 times daily       Rimegepant Sulfate (NURTEC) 75 MG TBDP Take 75 mg by mouth every other day maximum: 75 mg/24 hours. 16 tablet 5    methotrexate (RHEUMATREX) 2.5 MG chemo tablet take 4 tablets orally ONCE every week FOR 90 DAYS      folic acid (FOLVITE) 1 MG tablet Take 1 mg by mouth daily      secukinumab (COSENTYX SENSOREADY PEN) 150 MG/ML SOAJ INJECT TWO PENS SUBCUTANEOUSLY EVERY 4 WEEKS. REFRIGERATE. ALLOW 15 TO 30 MINUTES AT ROOM TEMP PRIOR TO ADMINISTRATION. No current facility-administered medications for this visit.        ALLERGIES    Allergies   Allergen Reactions    Sulfamethoxazole-Trimethoprim Hives and Swelling       PHYSICAL EXAM   Physical Exam  Vitals and nursing note reviewed. Constitutional:       General: She is not in acute distress. Appearance: She is well-developed. She is not diaphoretic. Interventions: Face mask in place. HENT:      Head: Normocephalic. Right Ear: Hearing normal.      Left Ear: Hearing normal.   Eyes:      General:         Right eye: No discharge. Left eye: No discharge. Pupils: Pupils are equal.   Cardiovascular:      Rate and Rhythm: Normal rate and regular rhythm. Pulses: Normal pulses. Radial pulses are 2+ on the right side and 2+ on the left side. Heart sounds: Normal heart sounds, S1 normal and S2 normal. No murmur heard. Pulmonary:      Effort: Pulmonary effort is normal. No respiratory distress. Breath sounds: Normal breath sounds. No wheezing. Musculoskeletal:      Cervical back: Normal range of motion. Skin:     General: Skin is warm and dry. Neurological:      Mental Status: She is alert and oriented to person, place, and time. Psychiatric:         Behavior: Behavior normal. Behavior is cooperative. ASSESSMENT/PLAN  1. COVID-19  Assessment & Plan:   At goal, Continue drinking plenty of fluids as able, rest as needed and monitor if increased HA are related to fatigue. 2. Migraine without aura and without status migrainosus, not intractable  Assessment & Plan:   At goal, continue current medications to include Nurtec ODT, but change to every other day preventative dosing. She is currently getting migraines a few times per week. She missed her appointment with neurology due to being positive for COVID, but has an appointment re-scheduled in February. Orders:  -     Rimegepant Sulfate (NURTEC) 75 MG TBDP; Take 75 mg by mouth every other day maximum: 75 mg/24 hours. , Disp-16 tablet, R-5Normal     Kaela received counseling on the following healthy behaviors: nutrition, exercise and medication adherence  Reviewed prior labs and health maintenance  Continue current medications, diet and exercise. Discussed use, benefit, and side effects of prescribed medications. Barriers to medication compliance addressed. Patient given educational materials - see patient instructions  Was a self-tracking handout given in paper form or via [employer]? Yes    Requested Prescriptions     Signed Prescriptions Disp Refills    Rimegepant Sulfate (NURTEC) 75 MG TBDP 16 tablet 5     Sig: Take 75 mg by mouth every other day maximum: 75 mg/24 hours. All patient questions answered. Patient voiced understanding. Quality Measures    Body mass index is 24.84 kg/m². Normal. Weight control planned discussed Healthy diet and regular exercise. BP: 102/68 Blood pressure is normal. Treatment plan consists of No treatment change needed. No results found for: LDLCALC, LDLCHOLESTEROL, LDLDIRECT (goal LDL reduction with dx if diabetes is 50% LDL reduction)      PHQ Scores 1/19/2022 6/4/2021 4/5/2021   PHQ2 Score 0 0 0   PHQ9 Score 0 0 0     Interpretation of Total Score Depression Severity: 1-4 = Minimal depression, 5-9 = Mild depression, 10-14 = Moderate depression, 15-19 = Moderately severe depression, 20-27 = Severe depression     Return in about 8 weeks (around 3/16/2022) for migraines .     (Please note that portions of this note were completed with a voice recognition program. Efforts were made to edit the dictations but occasionally words are mis-transcribed.)      Electronically signed by DEMETRA Frankel CNP on 1/19/22 at 11:24 AM EST

## 2022-01-19 NOTE — LETTER
[facility]  [address] [place] [address] [address] [facility] [address]  Phone: [phone]  Fax: [phone]    DEMETRA Sanchez CNP        January 19, 2022     Patient: Lalitha Emmanuel   YOB: 1990   Date of Visit: 1/19/2022       To Whom it May Concern:    Kvng Walsh was seen in my clinic on 1/19/2022. She may return to work on 01/22/2022. Please excuse Kvng Walsh form work on the dates of 01/03/2022 - 01/21/2022. If you have any questions or concerns, please don't hesitate to call.     Sincerely,           DEMETRA Sanchez CNP

## 2022-01-19 NOTE — LETTER
[facility] [address] [facility] [address]  Phone: [phone]  Fax: [phone]    DEMETRA Cobb CNP        January 19, 2022     Patient: Abdullahi Wood   YOB: 1990   Date of Visit: 1/19/2022       To Whom It May Concern: It is my medical opinion that [facility] may return to full duty immediately with no restrictions on 1/22/2022. Please excuse her absences from 1/3/2022- 1/21/2022 while she was recovering from [facility] 19. If you have any questions or concerns, please don't hesitate to call.     Sincerely,        DEMETRA Cobb CNP

## 2022-01-19 NOTE — ASSESSMENT & PLAN NOTE
At goal, continue current medications to include Nurtec ODT, but change to every other day preventative dosing. She is currently getting migraines a few times per week. She missed her appointment with neurology due to being positive for COVID, but has an appointment re-scheduled in February.

## 2022-02-04 DIAGNOSIS — G43.009 MIGRAINE WITHOUT AURA AND WITHOUT STATUS MIGRAINOSUS, NOT INTRACTABLE: ICD-10-CM

## 2022-02-04 RX ORDER — PROPRANOLOL HYDROCHLORIDE 40 MG/1
TABLET ORAL
Qty: 60 TABLET | Refills: 3 | OUTPATIENT
Start: 2022-02-04

## 2022-02-08 ENCOUNTER — PATIENT MESSAGE (OUTPATIENT)
Dept: FAMILY MEDICINE CLINIC | Age: 32
End: 2022-02-08

## 2022-02-08 NOTE — TELEPHONE ENCOUNTER
From: Nathaniel Grier  To: Chayo Pacheco  Sent: 2/8/2022 2:49 PM EST  Subject: Fmla     I need to get my fmla paperwork filled out. I wasnt sure if I need an appointment to get it filled out since you changed locations or if I can drop it off or email it to you.

## 2022-03-15 ENCOUNTER — OFFICE VISIT (OUTPATIENT)
Dept: FAMILY MEDICINE CLINIC | Age: 32
End: 2022-03-15
Payer: COMMERCIAL

## 2022-03-15 VITALS
SYSTOLIC BLOOD PRESSURE: 106 MMHG | OXYGEN SATURATION: 99 % | BODY MASS INDEX: 25.66 KG/M2 | WEIGHT: 144.8 LBS | HEIGHT: 63 IN | TEMPERATURE: 98.2 F | HEART RATE: 82 BPM | DIASTOLIC BLOOD PRESSURE: 68 MMHG

## 2022-03-15 DIAGNOSIS — L40.50 PSORIATIC ARTHRITIS (HCC): ICD-10-CM

## 2022-03-15 DIAGNOSIS — G43.009 MIGRAINE WITHOUT AURA AND WITHOUT STATUS MIGRAINOSUS, NOT INTRACTABLE: Primary | ICD-10-CM

## 2022-03-15 PROCEDURE — G8419 CALC BMI OUT NRM PARAM NOF/U: HCPCS | Performed by: NURSE PRACTITIONER

## 2022-03-15 PROCEDURE — G8427 DOCREV CUR MEDS BY ELIG CLIN: HCPCS | Performed by: NURSE PRACTITIONER

## 2022-03-15 PROCEDURE — G8484 FLU IMMUNIZE NO ADMIN: HCPCS | Performed by: NURSE PRACTITIONER

## 2022-03-15 PROCEDURE — 99213 OFFICE O/P EST LOW 20 MIN: CPT | Performed by: NURSE PRACTITIONER

## 2022-03-15 PROCEDURE — 1036F TOBACCO NON-USER: CPT | Performed by: NURSE PRACTITIONER

## 2022-03-15 RX ORDER — TRIAMCINOLONE ACETONIDE 1 MG/G
CREAM TOPICAL
COMMUNITY
Start: 2022-02-02

## 2022-03-15 RX ORDER — CLOBETASOL PROPIONATE 0.5 MG/G
OINTMENT TOPICAL
COMMUNITY
Start: 2022-02-02

## 2022-03-15 ASSESSMENT — PATIENT HEALTH QUESTIONNAIRE - PHQ9
SUM OF ALL RESPONSES TO PHQ QUESTIONS 1-9: 0
SUM OF ALL RESPONSES TO PHQ QUESTIONS 1-9: 0
SUM OF ALL RESPONSES TO PHQ9 QUESTIONS 1 & 2: 0
1. LITTLE INTEREST OR PLEASURE IN DOING THINGS: 0
SUM OF ALL RESPONSES TO PHQ QUESTIONS 1-9: 0
SUM OF ALL RESPONSES TO PHQ QUESTIONS 1-9: 0
2. FEELING DOWN, DEPRESSED OR HOPELESS: 0

## 2022-03-15 ASSESSMENT — ENCOUNTER SYMPTOMS
VOMITING: 1
CONSTIPATION: 0
DIARRHEA: 0
COUGH: 0
NAUSEA: 1
SHORTNESS OF BREATH: 0
RESPIRATORY NEGATIVE: 1
PHOTOPHOBIA: 1
ABDOMINAL PAIN: 0

## 2022-03-15 NOTE — PATIENT INSTRUCTIONS
[address] [facility] 3, First Floor  [address] [facility], [address] [facility]    Phone: [phone]  Fax: [phone]  Hours: Monday - Friday, 8:00 AM - 5:30 PM

## 2022-03-15 NOTE — PROGRESS NOTES
[facility]  [place] [address]  Dept: [phone]    3/15/2022    CHIEF COMPLAINT    Chief Complaint   Patient presents with    Migraine     8 week f/u       HPI    Rosa Elena Leonardo is a 33 y.o. female who presents   Chief Complaint   Patient presents with    Migraine     8 week f/u     Appointment to f/u on Migraines.     Migraines- stopped inderal due to fatigue. She is taking Nurtec a couple times per week with good effectiveness and no side effects. She is currently taking PRN not for prevention, but they are supplying her with 16 per month. She unfortunately had to cancel her neurology appointment today having Covid, but was rescheduled for February. TODAY- She was seen by neurology on 2/14. Reporting that she was started on a new medication called Venlafaxine/Effexor for episodic migraine prevention. She had n/v, abdominal pain and constipation. She d/c this medication and has been asked to try [place]. She hasn't picked this up from the pharmacy yet. MRI is scheduled tomorrow. Psoriatic arthritis-continue same [facility] clinic dermatology. Continues taking Cosentyx & methotrexate. Continues following with dermatology through [facility] clinic Dr. Thanh Wong. She was seen once and now will be seen on a PRN basis. Vitals:    03/15/22 1100   BP: 106/68   Pulse: 82   Temp: 98.2 °F (36.8 °C)   SpO2: 99%   Weight: 144 lb 12.8 oz (65.7 kg)   Height: 5' 3\" (1.6 m)       Wt Readings from Last 3 Encounters:   03/15/22 144 lb 12.8 oz (65.7 kg)   01/19/22 140 lb 3.2 oz (63.6 kg)   10/19/21 147 lb 9.6 oz (67 kg)     BP Readings from Last 3 Encounters:   03/15/22 106/68   01/19/22 102/68   10/19/21 88/60       REVIEW OF SYSTEMS    Review of Systems   Constitutional: Negative. Negative for chills, fatigue and fever. Eyes: Positive for photophobia. Respiratory: Negative.   Negative for cough and shortness of breath. Cardiovascular: Negative. Negative for chest pain and palpitations. Gastrointestinal: Positive for nausea and vomiting. Negative for abdominal pain, constipation and diarrhea. Genitourinary: Negative. Musculoskeletal: Negative. Neurological: Negative. Negative for dizziness and headaches. Psychiatric/Behavioral: Negative. Negative for dysphoric mood. The patient is not nervous/anxious. PAST MEDICAL HISTORY    Past Medical History:   Diagnosis Date    COVID-19 1/19/2022    Migraine without aura and without status migrainosus, not intractable 4/5/2021    Psoriasis 4/5/2021    Psoriatic arthritis ([facility] [address] 4/5/2021       FAMILY HISTORY    Family History   Problem Relation Age of Onset    Cervical Cancer Mother     Anxiety Disorder Mother     Psoriasis Mother     Diabetes type 2  Father    [facility] Migraines Father     High Cholesterol Father    [facility] Migraines Sister     Psoriasis Sister     Anxiety Disorder Sister     No Known Problems Brother     Migraines Maternal Grandmother     Heart Surgery Maternal Grandmother         heart valve replacement     Diabetes Maternal Grandmother     Kidney Disease Maternal Grandmother         on HD     Other Maternal Grandmother         DVT in arms     Heart Attack Maternal Grandfather 72        COD     No Known Problems Paternal Grandmother         unknown health history    No Known Problems Paternal Grandfather         unknown health history        SOCIAL HISTORY    Social History     Socioeconomic History    Marital status: Single     Spouse name: None    Number of children: 3    Years of education: None    Highest education level: None   Occupational History    None   Tobacco Use    Smoking status: Never Smoker    Smokeless tobacco: Never Used   Vaping Use    Vaping Use: Never used   Substance and Sexual Activity    Alcohol use:  Yes    Drug use: Never    Sexual activity: Yes     Partners: Male     Birth control/protection: Surgical   Other Topics Concern    None   Social History Narrative    None     Social Determinants of Health     Financial Resource Strain: Low Risk     Difficulty of Paying Living Expenses: Not hard at all   Food Insecurity: No Food Insecurity    Worried About Running Out of Food in the Last Year: Never true    Dana of Food in the Last Year: Never true   Transportation Needs: No Transportation Needs    Lack of Transportation (Medical): No    Lack of Transportation (Non-Medical): No   Physical Activity:     Days of Exercise per Week: Not on file    Minutes of Exercise per Session: Not on file   Stress:     Feeling of Stress : Not on file   Social Connections:     Frequency of Communication with Friends and Family: Not on file    Frequency of Social Gatherings with Friends and Family: Not on file    Attends Sabianism Services: Not on file    Active Member of [address] [place] [address] or Organizations: Not on file    Attends Club or Organization Meetings: Not on file    Marital Status: Not on file   Intimate Partner Violence:     Fear of Current or Ex-Partner: Not on file    Emotionally Abused: Not on file    Physically Abused: Not on file    Sexually Abused: Not on file   Housing Stability:     Unable to Pay for Housing in the Last Year: Not on file    Number of [place] in the Last Year: Not on file    Unstable Housing in the Last Year: Not on file       SURGICAL HISTORY    Past Surgical History:   Procedure Laterality Date    TUBAL LIGATION  2016    WISDOM TOOTH EXTRACTION         CURRENT MEDICATIONS    Current Outpatient Medications   Medication Sig Dispense Refill    triamcinolone (ARISTOCORT) 0.5 % cream Apply topically 2 times daily       Rimegepant Sulfate (NURTEC) 75 MG TBDP Take 75 mg by mouth every other day maximum: 75 mg/24 hours.  16 tablet 5    methotrexate (RHEUMATREX) 2.5 MG chemo tablet take 4 tablets orally ONCE every week FOR 90 DAYS      folic acid (FOLVITE) 1 MG tablet Take 1 mg by mouth daily      secukinumab (COSENTYX SENSOREADY PEN) 150 MG/ML SOAJ INJECT TWO PENS SUBCUTANEOUSLY EVERY 4 WEEKS. REFRIGERATE. ALLOW 15 TO 30 MINUTES AT ROOM TEMP PRIOR TO ADMINISTRATION.  clobetasol (TEMOVATE) 0.05 % ointment apply to affected area twice a day USE ON EARS AND ELBOWS      triamcinolone (KENALOG) 0.1 % cream apply A thin layer twice a day to affected area [facility] . ..  (REFER TO PRESCRIPTION NOTES). No current facility-administered medications for this visit. ALLERGIES    Allergies   Allergen Reactions    Sulfamethoxazole-Trimethoprim Hives and Swelling    Venlafaxine Hcl Er Nausea And Vomiting     Abdominal pain and nausea        PHYSICAL EXAM   Physical Exam  Vitals and nursing note reviewed. Constitutional:       General: She is not in acute distress. Appearance: She is well-developed. She is not diaphoretic. Interventions: Face mask in place. HENT:      Head: Normocephalic. Right Ear: Hearing normal.      Left Ear: Hearing normal.   Eyes:      General:         Right eye: No discharge. Left eye: No discharge. Pupils: Pupils are equal.   Cardiovascular:      Rate and Rhythm: Normal rate and regular rhythm. Pulses: Normal pulses. Radial pulses are 2+ on the right side and 2+ on the left side. Heart sounds: Normal heart sounds, S1 normal and S2 normal. No murmur heard. Pulmonary:      Effort: Pulmonary effort is normal. No respiratory distress. Breath sounds: Normal breath sounds. No wheezing. Musculoskeletal:      Cervical back: Normal range of motion. Skin:     General: Skin is warm and dry. Neurological:      Mental Status: She is alert and oriented to person, place, and time. Psychiatric:         Behavior: Behavior normal. Behavior is cooperative. ASSESSMENT/PLAN  1.  Migraine without aura and without status migrainosus, not intractable  Assessment & Plan:   Borderline controlled, continue current treatment plan I am following with neurology as advised. Office will request results treatment notes  2. Psoriatic arthritis ([facility] [address]  Assessment & Plan:   Well-controlled, continue current medications and following with rheumatology as advised. We will continue seeing dermatology, but only on an as-needed basis       Kaela received counseling on the following healthy behaviors: nutrition, exercise and medication adherence  Reviewed prior labs and health maintenance  Continue current medications, diet and exercise. Discussed use, benefit, and side effects of prescribed medications. Barriers to medication compliance addressed. Patient given educational materials - see patient instructions  Was a self-tracking handout given in paper form or via [email or web address]? Yes    Requested Prescriptions      No prescriptions requested or ordered in this encounter       All patient questions answered. Patient voiced understanding. Quality Measures    Body mass index is 25.65 kg/m². Normal. Weight control planned discussed Healthy diet and regular exercise. BP: 106/68 Blood pressure is normal. Treatment plan consists of No treatment change needed. No results found for: LDLCALC, LDLCHOLESTEROL, LDLDIRECT (goal LDL reduction with dx if diabetes is 50% LDL reduction)      PHQ Scores 3/15/2022 1/19/2022 6/4/2021 4/5/2021   PHQ2 Score 0 0 0 0   PHQ9 Score 0 0 0 0     Interpretation of Total Score Depression Severity: 1-4 = Minimal depression, 5-9 = Mild depression, 10-14 = Moderate depression, 15-19 = Moderately severe depression, 20-27 = Severe depression     Return in about 6 months (around 9/15/2022) for Migraines.     (Please note that portions of this note were completed with a voice recognition program. Efforts were made to edit the dictations but occasionally words are mis-transcribed.)      Electronically signed by DEMETRA Villanueva CNP on 3/15/22 at 9:30 AM LEIDY

## 2022-03-30 ENCOUNTER — TELEPHONE (OUTPATIENT)
Dept: FAMILY MEDICINE CLINIC | Age: 32
End: 2022-03-30

## 2022-03-30 PROBLEM — U07.1 COVID-19: Status: RESOLVED | Noted: 2022-01-19 | Resolved: 2022-03-30

## 2022-03-30 NOTE — TELEPHONE ENCOUNTER
Please call Dr. Verna Shine office for office notes from 2/14 and Dr. Samuel Boothe through [facility] clinic dermatology from 1/22 to request office notes

## 2022-03-30 NOTE — ASSESSMENT & PLAN NOTE
Borderline controlled, continue current treatment plan I am following with neurology as advised.   Office will request results treatment notes

## 2022-03-30 NOTE — ASSESSMENT & PLAN NOTE
Well-controlled, continue current medications and following with rheumatology as advised.   We will continue seeing dermatology, but only on an as-needed basis

## 2023-03-07 SDOH — ECONOMIC STABILITY: INCOME INSECURITY: HOW HARD IS IT FOR YOU TO PAY FOR THE VERY BASICS LIKE FOOD, HOUSING, MEDICAL CARE, AND HEATING?: NOT HARD AT ALL

## 2023-03-07 SDOH — ECONOMIC STABILITY: FOOD INSECURITY: WITHIN THE PAST 12 MONTHS, THE FOOD YOU BOUGHT JUST DIDN'T LAST AND YOU DIDN'T HAVE MONEY TO GET MORE.: NEVER TRUE

## 2023-03-07 SDOH — ECONOMIC STABILITY: FOOD INSECURITY: WITHIN THE PAST 12 MONTHS, YOU WORRIED THAT YOUR FOOD WOULD RUN OUT BEFORE YOU GOT MONEY TO BUY MORE.: NEVER TRUE

## 2023-03-07 SDOH — ECONOMIC STABILITY: HOUSING INSECURITY
IN THE LAST 12 MONTHS, WAS THERE A TIME WHEN YOU DID NOT HAVE A STEADY PLACE TO SLEEP OR SLEPT IN A SHELTER (INCLUDING NOW)?: NO

## 2023-03-08 ENCOUNTER — OFFICE VISIT (OUTPATIENT)
Dept: FAMILY MEDICINE CLINIC | Age: 33
End: 2023-03-08
Payer: COMMERCIAL

## 2023-03-08 VITALS
HEIGHT: 63 IN | OXYGEN SATURATION: 100 % | HEART RATE: 74 BPM | BODY MASS INDEX: 24.8 KG/M2 | TEMPERATURE: 97.8 F | RESPIRATION RATE: 17 BRPM | WEIGHT: 140 LBS | DIASTOLIC BLOOD PRESSURE: 80 MMHG | SYSTOLIC BLOOD PRESSURE: 115 MMHG

## 2023-03-08 DIAGNOSIS — Z80.49 FAMILY HISTORY OF CERVICAL CANCER: ICD-10-CM

## 2023-03-08 DIAGNOSIS — R93.0 OPACIFICATION OF LEFT MAXILLARY SINUS: ICD-10-CM

## 2023-03-08 DIAGNOSIS — L40.50 PSORIATIC ARTHRITIS (HCC): ICD-10-CM

## 2023-03-08 DIAGNOSIS — G43.009 MIGRAINE WITHOUT AURA AND WITHOUT STATUS MIGRAINOSUS, NOT INTRACTABLE: Primary | ICD-10-CM

## 2023-03-08 PROCEDURE — G8420 CALC BMI NORM PARAMETERS: HCPCS | Performed by: NURSE PRACTITIONER

## 2023-03-08 PROCEDURE — 99213 OFFICE O/P EST LOW 20 MIN: CPT | Performed by: NURSE PRACTITIONER

## 2023-03-08 PROCEDURE — G8427 DOCREV CUR MEDS BY ELIG CLIN: HCPCS | Performed by: NURSE PRACTITIONER

## 2023-03-08 PROCEDURE — G8484 FLU IMMUNIZE NO ADMIN: HCPCS | Performed by: NURSE PRACTITIONER

## 2023-03-08 PROCEDURE — 1036F TOBACCO NON-USER: CPT | Performed by: NURSE PRACTITIONER

## 2023-03-08 RX ORDER — SUMATRIPTAN 100 MG/1
TABLET, FILM COATED ORAL
COMMUNITY
Start: 2023-02-03

## 2023-03-08 RX ORDER — SECUKINUMAB 150 MG/ML
INJECTION SUBCUTANEOUS
COMMUNITY
Start: 2023-02-19

## 2023-03-08 RX ORDER — BUTALBITAL, ACETAMINOPHEN AND CAFFEINE 50; 325; 40 MG/1; MG/1; MG/1
1 TABLET ORAL EVERY 6 HOURS PRN
Qty: 90 TABLET | Refills: 3 | Status: SHIPPED | OUTPATIENT
Start: 2023-03-08

## 2023-03-08 RX ORDER — ATOGEPANT 60 MG/1
TABLET ORAL
COMMUNITY
Start: 2023-01-16

## 2023-03-08 ASSESSMENT — ENCOUNTER SYMPTOMS
DIARRHEA: 0
RESPIRATORY NEGATIVE: 1
ABDOMINAL PAIN: 0
CONSTIPATION: 0
COUGH: 0
GASTROINTESTINAL NEGATIVE: 1
PHOTOPHOBIA: 1
SHORTNESS OF BREATH: 0
VOMITING: 0
NAUSEA: 0

## 2023-03-08 ASSESSMENT — PATIENT HEALTH QUESTIONNAIRE - PHQ9
SUM OF ALL RESPONSES TO PHQ9 QUESTIONS 1 & 2: 0
1. LITTLE INTEREST OR PLEASURE IN DOING THINGS: 0
5. POOR APPETITE OR OVEREATING: 0
10. IF YOU CHECKED OFF ANY PROBLEMS, HOW DIFFICULT HAVE THESE PROBLEMS MADE IT FOR YOU TO DO YOUR WORK, TAKE CARE OF THINGS AT HOME, OR GET ALONG WITH OTHER PEOPLE: 0
6. FEELING BAD ABOUT YOURSELF - OR THAT YOU ARE A FAILURE OR HAVE LET YOURSELF OR YOUR FAMILY DOWN: 0
SUM OF ALL RESPONSES TO PHQ QUESTIONS 1-9: 6
SUM OF ALL RESPONSES TO PHQ QUESTIONS 1-9: 6
4. FEELING TIRED OR HAVING LITTLE ENERGY: 3
8. MOVING OR SPEAKING SO SLOWLY THAT OTHER PEOPLE COULD HAVE NOTICED. OR THE OPPOSITE, BEING SO FIGETY OR RESTLESS THAT YOU HAVE BEEN MOVING AROUND A LOT MORE THAN USUAL: 0
SUM OF ALL RESPONSES TO PHQ QUESTIONS 1-9: 6
7. TROUBLE CONCENTRATING ON THINGS, SUCH AS READING THE NEWSPAPER OR WATCHING TELEVISION: 0
9. THOUGHTS THAT YOU WOULD BE BETTER OFF DEAD, OR OF HURTING YOURSELF: 0
3. TROUBLE FALLING OR STAYING ASLEEP: 3
2. FEELING DOWN, DEPRESSED OR HOPELESS: 0
SUM OF ALL RESPONSES TO PHQ QUESTIONS 1-9: 6

## 2023-03-08 NOTE — PROGRESS NOTES
[facility]  [address] [facility]  [place] [address] [facility] [address]  Dept: [phone]    3/8/2023    CHIEF COMPLAINT    Chief Complaint   Patient presents with    Migraine    Forms     FMLA forms to be completed for migraines       HPI    Perico Price is a 33 y.o. female who presents   Chief Complaint   Patient presents with    Migraine    Forms     FMLA forms to be completed for migraines     Appointment to f/u on Migraines and psoriatic arthritis. Hugo Shah every 3 months. Taking Qulipta 60 mg for daily preventative, imitrex for abortive and  Fioricet for headaches. She is having about 2 headache days per week on average. Migraines are roughly 1-2 per week when not on her period and daily when she is the week before her period. Migraine hx- Inderal caused fatigue, nurtec worked okay, but she would still have to go to sleep to get her migraine to fully resolve; Maxalt caused nausea. Effexor caused n/v, abdominal pain and constipation. Recent MRI performed by neurology showed a 9 mm retention cyst or polyp in her left maxillary sinuses. She has been referred to ENT but has not been seen yet    Psoriatic arthritis- Seeing rheumatology. Taking Xosentyx and methotrexate. Denies any side effects. PHQ-9 Total Score: 6 (3/8/2023 11:44 AM)  Thoughts that you would be better off dead, or of hurting yourself in some way: 0 (3/8/2023 11:44 AM)    She has not had a pap in a long time. She is aware of the importance given her maternal history of cervical cancer.    Her OB/GYN is no longer practicing      Vitals:    03/08/23 1141   BP: 115/80   Site: Left Upper Arm   Position: Sitting   Cuff Size: Large Adult   Pulse: 74   Resp: 17   Temp: 97.8 °F (36.6 °C)   TempSrc: Temporal   SpO2: 100%   Weight: 140 lb (63.5 kg)   Height: 5' 3\" (1.6 m)       Wt Readings from Last 3 Encounters:   03/08/23 140 lb (63.5 kg)   03/15/22 144 lb 12.8 oz (65.7 kg)   01/19/22 140 lb 3.2 oz (63.6 kg)     BP Readings from Last 3 Encounters:   03/08/23 115/80   03/15/22 106/68   01/19/22 102/68       REVIEW OF SYSTEMS    Review of Systems   Constitutional: Negative. Negative for chills, fatigue and fever. Eyes:  Positive for photophobia. Respiratory: Negative. Negative for cough and shortness of breath. Cardiovascular: Negative. Negative for chest pain and palpitations. Gastrointestinal: Negative. Negative for abdominal pain, constipation, diarrhea, nausea and vomiting. Genitourinary: Negative. Musculoskeletal: Negative. Neurological:  Positive for headaches. Negative for dizziness. Psychiatric/Behavioral: Negative. Negative for dysphoric mood. The patient is not nervous/anxious.       PAST MEDICAL HISTORY    Past Medical History:   Diagnosis Date    COVID-19 1/19/2022    Migraine without aura and without status migrainosus, not intractable 4/5/2021    Psoriasis 4/5/2021    Psoriatic arthritis ([facility] [address] 4/5/2021       FAMILY HISTORY    Family History   Problem Relation Age of Onset    Cervical Cancer Mother     Anxiety Disorder Mother     Psoriasis Mother     Diabetes type 2  Father     Migraines Father     High Cholesterol Father     Migraines Sister     Psoriasis Sister     Anxiety Disorder Sister     No Known Problems Brother     Migraines Maternal Grandmother     Heart Surgery Maternal Grandmother         heart valve replacement     Diabetes Maternal Grandmother     Kidney Disease Maternal Grandmother         on HD     Other Maternal Grandmother         DVT in arms     Heart Attack Maternal Grandfather 72        COD     No Known Problems Paternal Grandmother         unknown health history    No Known Problems Paternal Grandfather         unknown health history        SOCIAL HISTORY    Social History     Socioeconomic History    Marital status: Single     Spouse name: None    Number of children: 3    Years of education: None Highest education level: None   Tobacco Use    Smoking status: Never    Smokeless tobacco: Never   Vaping Use    Vaping Use: Never used   Substance and Sexual Activity    Alcohol use: Yes    Drug use: Never    Sexual activity: Yes     Partners: Male     Birth control/protection: Surgical     Social Determinants of Health     Financial Resource Strain: Low Risk     Difficulty of Paying Living Expenses: Not hard at all   Food Insecurity: No Food Insecurity    Worried About [address] [employer] in the Last Year: Never true    Ran Out of Food in the Last Year: Never true   Transportation Needs: Unknown    Lack of Transportation (Non-Medical): No   Housing Stability: Unknown    Unstable Housing in the Last Year: No       SURGICAL HISTORY    Past Surgical History:   Procedure Laterality Date    TUBAL LIGATION  2016    WISDOM TOOTH EXTRACTION         CURRENT MEDICATIONS    Current Outpatient Medications   Medication Sig Dispense Refill    QULIPTA 60 MG TABS take 1 tablet by mouth once daily      SUMAtriptan (IMITREX) 100 MG tablet take 1 tablet by mouth AT ONSET OF HEADACHE may repeat in 2 hours. ..  (REFER TO PRESCRIPTION NOTES). COSENTYX SENSOREADY, 300 MG, 150 MG/ML SOAJ       butalbital-acetaminophen-caffeine (FIORICET, ESGIC) -40 MG per tablet Take 1 tablet by mouth every 6 hours as needed for Headaches or Migraine 90 tablet 3    clobetasol (TEMOVATE) 0.05 % ointment apply to affected area twice a day USE ON EARS AND ELBOWS      triamcinolone (KENALOG) 0.1 % cream apply A thin layer twice a day to affected area [facility] . ..  (REFER TO PRESCRIPTION NOTES). triamcinolone (ARISTOCORT) 0.5 % cream Apply topically 2 times daily       Rimegepant Sulfate (NURTEC) 75 MG TBDP Take 75 mg by mouth every other day maximum: 75 mg/24 hours.  16 tablet 5    methotrexate (RHEUMATREX) 2.5 MG chemo tablet take 4 tablets orally ONCE every week FOR 90 DAYS      folic acid (FOLVITE) 1 MG tablet Take 1 mg by mouth daily       No current facility-administered medications for this visit. ALLERGIES    Allergies   Allergen Reactions    Sulfamethoxazole-Trimethoprim Hives and Swelling    Venlafaxine Hcl Er Nausea And Vomiting     Abdominal pain and nausea        PHYSICAL EXAM   Physical Exam  Vitals and nursing note reviewed. Constitutional:       General: She is not in acute distress. Appearance: She is well-developed. She is not diaphoretic. Interventions: Face mask in place. HENT:      Head: Normocephalic. Right Ear: Hearing normal.      Left Ear: Hearing normal.   Eyes:      General:         Right eye: No discharge. Left eye: No discharge. Pupils: Pupils are equal.   Cardiovascular:      Rate and Rhythm: Normal rate and regular rhythm. Pulses: Normal pulses. Radial pulses are 2+ on the right side and 2+ on the left side. Heart sounds: Normal heart sounds, S1 normal and S2 normal. No murmur heard. Pulmonary:      Effort: Pulmonary effort is normal. No respiratory distress. Breath sounds: Normal breath sounds. No wheezing. Musculoskeletal:      Cervical back: Normal range of motion. Skin:     General: Skin is warm and dry. Neurological:      Mental Status: She is alert and oriented to person, place, and time. Psychiatric:         Behavior: Behavior normal. Behavior is cooperative. ASSESSMENT/PLAN  1. Migraine without aura and without status migrainosus, not intractable  -     butalbital-acetaminophen-caffeine (FIORICET, ESGIC) -40 MG per tablet; Take 1 tablet by mouth every 6 hours as needed for Headaches or Migraine, Disp-90 tablet, R-3Normal  2. Psoriatic arthritis ([address]  Assessment & Plan:   Monitored by specialist- no acute findings meriting change in the plan  3. Family history of cervical cancer  4. Opacification of left maxillary sinus    Migraines stable. Continue following with neurology as advised.   We will continue to fill out FMLA, but patient was advised she needs appointments every 3 to 4 months. Discussed how it leandro be easier for neurology to fill her paperwork out. Encouraged patient to schedule with OB/GYN for Pap  Patient encouraged to make appointment with ENT. ADDENDUM: Office requested Pap and immunization records from Dr. Karely Pichardo, but nothing could be found as practices no longer open and patient was last seen 7 years ago     Kaela received counseling on the following healthy behaviors: nutrition, exercise, and medication adherence  Reviewed prior labs and health maintenance  Continue current medications, diet and exercise. Discussed use, benefit, and side effects of prescribed medications. Barriers to medication compliance addressed. Patient given educational materials - see patient instructions  Was a self-tracking handout given in paper form or via [employer]? Yes    Requested Prescriptions     Signed Prescriptions Disp Refills    butalbital-acetaminophen-caffeine (FIORICET, ESGIC) -40 MG per tablet 90 tablet 3     Sig: Take 1 tablet by mouth every 6 hours as needed for Headaches or Migraine       All patient questions answered. Patient voiced understanding. Quality Measures    Body mass index is 24.8 kg/m². Normal. Weight control planned discussed Healthy diet and regular exercise. BP: 115/80 Blood pressure is normal. Treatment plan consists of No treatment change needed. No results found for: LDLCALC, LDLCHOLESTEROL, LDLDIRECT (goal LDL reduction with dx if diabetes is 50% LDL reduction)      PHQ Scores 3/8/2023 3/15/2022 1/19/2022 6/4/2021 4/5/2021   PHQ2 Score 0 0 0 0 0   PHQ9 Score 6 0 0 0 0     Interpretation of Total Score Depression Severity: 1-4 = Minimal depression, 5-9 = Mild depression, 10-14 = Moderate depression, 15-19 = Moderately severe depression, 20-27 = Severe depression     Return in about 4 months (around 7/8/2023) for migraines.     (Please note that portions of this note were completed with a voice recognition program. Efforts were made to edit the dictations but occasionally words are mis-transcribed.)      Electronically signed by DEMETRA [place] CNP on 3/8/23 at 12:17 PM EST

## 2023-03-08 NOTE — PATIENT INSTRUCTIONS
New Updates for [facility] ELSY    Thank you for choosing [facility] to give you the best care! [facility] ([facility]) is always trying to think of new ways to help their patients. We are asking all patients to try out the new digital registration that is now available through the [employer] [address] Down load today! . Via the elsy you're now able to update your personal and registration information prior to your upcoming appointment. This will save you time once you arrive at the office to check-in, not to mention your information remains safe!! Many other perks come from signing up for an account, such as:  Requesting refills  Scheduling an appointment  Completing an E-Visit  Sending a message to the office/provider  Having access to your medication list  Paying your bill/copay prior to your appointment  Scheduling your yearly mammogram  Review your test results    If you are not familiar the [facility] ELSY, please ask one of us and we will be happy to answer any questions or help you set-up your account.

## 2023-07-11 ENCOUNTER — OFFICE VISIT (OUTPATIENT)
Dept: FAMILY MEDICINE CLINIC | Age: 33
End: 2023-07-11
Payer: COMMERCIAL

## 2023-07-11 VITALS
HEIGHT: 63 IN | BODY MASS INDEX: 25.02 KG/M2 | HEART RATE: 86 BPM | SYSTOLIC BLOOD PRESSURE: 116 MMHG | OXYGEN SATURATION: 98 % | RESPIRATION RATE: 15 BRPM | DIASTOLIC BLOOD PRESSURE: 80 MMHG | WEIGHT: 141.2 LBS | TEMPERATURE: 98.2 F

## 2023-07-11 DIAGNOSIS — L40.50 PSORIATIC ARTHRITIS (HCC): ICD-10-CM

## 2023-07-11 DIAGNOSIS — Z80.49 FAMILY HISTORY OF CERVICAL CANCER: ICD-10-CM

## 2023-07-11 DIAGNOSIS — G43.009 MIGRAINE WITHOUT AURA AND WITHOUT STATUS MIGRAINOSUS, NOT INTRACTABLE: Primary | ICD-10-CM

## 2023-07-11 PROCEDURE — 99214 OFFICE O/P EST MOD 30 MIN: CPT | Performed by: NURSE PRACTITIONER

## 2023-07-11 RX ORDER — TOPIRAMATE 25 MG/1
TABLET ORAL
COMMUNITY
Start: 2023-07-04

## 2023-07-11 ASSESSMENT — PATIENT HEALTH QUESTIONNAIRE - PHQ9
SUM OF ALL RESPONSES TO PHQ QUESTIONS 1-9: 0
2. FEELING DOWN, DEPRESSED OR HOPELESS: 0
SUM OF ALL RESPONSES TO PHQ9 QUESTIONS 1 & 2: 0
1. LITTLE INTEREST OR PLEASURE IN DOING THINGS: 0
SUM OF ALL RESPONSES TO PHQ QUESTIONS 1-9: 0

## 2023-07-11 ASSESSMENT — ENCOUNTER SYMPTOMS
NAUSEA: 0
DIARRHEA: 0
SHORTNESS OF BREATH: 0
GASTROINTESTINAL NEGATIVE: 1
RESPIRATORY NEGATIVE: 1
CONSTIPATION: 0
VOMITING: 0
PHOTOPHOBIA: 1
ABDOMINAL PAIN: 0
COUGH: 0

## 2023-07-11 NOTE — PATIENT INSTRUCTIONS
New Updates for [facility] ELSY    Thank you for choosing [facility] to give you the best care! [facility] ([facility]) is always trying to think of new ways to help their patients. We are asking all patients to try out the new digital registration that is now available through the [address] Down load today! . Via the elsy you're now able to update your personal and registration information prior to your upcoming appointment. This will save you time once you arrive at the office to check-in, not to mention your information remains safe!! Many other perks come from signing up for an account, such as:  Requesting refills  Scheduling an appointment  Completing an E-Visit  Sending a message to the office/provider  Having access to your medication list  Paying your bill/copay prior to your appointment  Scheduling your yearly mammogram  Review your test results    If you are not familiar the [facility] ELSY, please ask one of us and we will be happy to answer any questions or help you set-up your account.

## 2023-07-24 NOTE — ASSESSMENT & PLAN NOTE
At goal, continue current medications, continue current treatment plan and Call our office if needing samples of Arcelia Reese in the future if samples are unavailable through neurology.

## 2023-11-13 ENCOUNTER — OFFICE VISIT (OUTPATIENT)
Dept: FAMILY MEDICINE CLINIC | Age: 33
End: 2023-11-13
Payer: COMMERCIAL

## 2023-11-13 VITALS
WEIGHT: 140.8 LBS | DIASTOLIC BLOOD PRESSURE: 80 MMHG | BODY MASS INDEX: 24.95 KG/M2 | OXYGEN SATURATION: 98 % | TEMPERATURE: 98 F | HEIGHT: 63 IN | HEART RATE: 86 BPM | SYSTOLIC BLOOD PRESSURE: 118 MMHG | RESPIRATION RATE: 16 BRPM

## 2023-11-13 DIAGNOSIS — L40.50 PSORIATIC ARTHRITIS (HCC): ICD-10-CM

## 2023-11-13 DIAGNOSIS — R11.0 NAUSEA: ICD-10-CM

## 2023-11-13 DIAGNOSIS — Z80.49 FAMILY HISTORY OF CERVICAL CANCER: ICD-10-CM

## 2023-11-13 DIAGNOSIS — G43.009 MIGRAINE WITHOUT AURA AND WITHOUT STATUS MIGRAINOSUS, NOT INTRACTABLE: Primary | ICD-10-CM

## 2023-11-13 PROCEDURE — 99213 OFFICE O/P EST LOW 20 MIN: CPT | Performed by: NURSE PRACTITIONER

## 2023-11-13 RX ORDER — PREDNISONE 5 MG/1
TABLET ORAL
COMMUNITY
Start: 2023-05-03

## 2023-11-13 RX ORDER — ONDANSETRON 4 MG/1
4 TABLET, FILM COATED ORAL DAILY PRN
Qty: 30 TABLET | Refills: 2 | Status: SHIPPED | OUTPATIENT
Start: 2023-11-13

## 2023-11-13 ASSESSMENT — ENCOUNTER SYMPTOMS
CONSTIPATION: 0
COUGH: 0
ABDOMINAL PAIN: 0
SHORTNESS OF BREATH: 0
VOMITING: 0
DIARRHEA: 0
RESPIRATORY NEGATIVE: 1
PHOTOPHOBIA: 1
NAUSEA: 0
GASTROINTESTINAL NEGATIVE: 1

## 2023-11-13 ASSESSMENT — PATIENT HEALTH QUESTIONNAIRE - PHQ9
SUM OF ALL RESPONSES TO PHQ QUESTIONS 1-9: 0
SUM OF ALL RESPONSES TO PHQ9 QUESTIONS 1 & 2: 0
SUM OF ALL RESPONSES TO PHQ QUESTIONS 1-9: 0
1. LITTLE INTEREST OR PLEASURE IN DOING THINGS: 0
SUM OF ALL RESPONSES TO PHQ QUESTIONS 1-9: 0
2. FEELING DOWN, DEPRESSED OR HOPELESS: 0
SUM OF ALL RESPONSES TO PHQ QUESTIONS 1-9: 0

## 2023-11-13 NOTE — PATIENT INSTRUCTIONS
New Updates for My [facility] ELSY    Thank you for choosing [facility] to give you the best care! [facility] ([facility]) is always trying to think of new ways to help their patients. We are asking all patients to try out the new digital registration that is now available through the [employer] [address]. Down load today! . Via the elsy you're now able to update your personal and registration information prior to your upcoming appointment. This will save you time once you arrive at the office to check-in, not to mention your information remains safe!! Many other perks come from signing up for an account, such as:  Requesting refills  Scheduling an appointment  Completing an E-Visit  Sending a message to the office/provider  Having access to your medication list  Paying your bill/copay prior to your appointment  Scheduling your yearly mammogram  Review your test results    If you are not familiar the [facility] ELSY, please ask one of us and we will be happy to answer any questions or help you set-up your account.

## 2023-12-12 ENCOUNTER — OFFICE VISIT (OUTPATIENT)
Dept: OBGYN CLINIC | Age: 33
End: 2023-12-12
Payer: COMMERCIAL

## 2023-12-12 ENCOUNTER — HOSPITAL ENCOUNTER (OUTPATIENT)
Age: 33
Setting detail: SPECIMEN
Discharge: HOME | End: 2023-12-12

## 2023-12-12 VITALS
WEIGHT: 142 LBS | SYSTOLIC BLOOD PRESSURE: 105 MMHG | BODY MASS INDEX: 25.15 KG/M2 | DIASTOLIC BLOOD PRESSURE: 75 MMHG | HEART RATE: 80 BPM

## 2023-12-12 DIAGNOSIS — N85.2 ENLARGED UTERUS: ICD-10-CM

## 2023-12-12 DIAGNOSIS — Z01.419 ENCOUNTER FOR GYNECOLOGICAL EXAMINATION: Primary | ICD-10-CM

## 2023-12-12 PROCEDURE — 99385 PREV VISIT NEW AGE 18-39: CPT | Performed by: NURSE PRACTITIONER

## 2023-12-12 ASSESSMENT — ENCOUNTER SYMPTOMS
COUGH: 0
ALLERGIC/IMMUNOLOGIC NEGATIVE: 1
RESPIRATORY NEGATIVE: 1
BACK PAIN: 0
SHORTNESS OF BREATH: 0
ABDOMINAL DISTENTION: 0
GASTROINTESTINAL NEGATIVE: 1

## 2023-12-12 NOTE — PROGRESS NOTES
[facility] Jovita Conley  [address] [place] [address] AJ Manzanares  Dept: [phone]      12/12/23    [address] [place] [address]       Gyn Annual Exam      CHIEF COMPLAINT:    Chief Complaint   Patient presents with    Annual Exam                Blood pressure 105/75, pulse 80, weight 64.4 kg (142 lb), last menstrual period 11/22/2023. HPI :   Tay [facility] 1990 is a 29 y.o. Zionnati Lacy  who is here for her annual exam. She has not seen a gynecologist in about 8 years. She has no specific concerns. She has had a tubal.  Same partner x 11 years      Periods are monthly, occurring every 30 days and lasting 5 days.    HMB- first 2 days  Dysmenorrhea- yes    Works at [employer]  _____________________________________________________________________  Past Medical History:   Diagnosis Date    COVID-19 1/19/2022    Migraine without aura and without status migrainosus, not intractable 4/5/2021    Psoriasis 4/5/2021    Psoriatic arthritis ([address]) 4/5/2021                                                                   Past Surgical History:   Procedure Laterality Date    TUBAL LIGATION  2016    WISDOM TOOTH EXTRACTION       Family History   Problem Relation Age of Onset    Cervical Cancer Mother     Anxiety Disorder Mother     Psoriasis Mother     Diabetes type 2  Father     Migraines Father     High Cholesterol Father     Migraines Sister     Psoriasis Sister     Anxiety Disorder Sister     No Known Problems Brother     Migraines Maternal Grandmother     Heart Surgery Maternal Grandmother         heart valve replacement     Diabetes Maternal Grandmother     Kidney Disease Maternal Grandmother         on HD     Other Maternal Grandmother         DVT in arms     Heart Attack Maternal Grandfather 72        COD     No Known Problems Paternal Grandmother         unknown health history    No Known Problems Paternal Grandfather         unknown health

## 2023-12-14 LAB
HPV I/H RISK 4 DNA CVX QL NAA+PROBE: NOT DETECTED
HPV SAMPLE: NORMAL
HPV, INTERPRETATION: NORMAL
HPV16 DNA CVX QL NAA+PROBE: NOT DETECTED
HPV18 DNA CVX QL NAA+PROBE: NOT DETECTED
SPECIMEN DESCRIPTION: NORMAL

## 2024-03-13 ENCOUNTER — PATIENT MESSAGE (OUTPATIENT)
Dept: FAMILY MEDICINE CLINIC | Age: 34
End: 2024-03-13

## 2024-03-13 ENCOUNTER — OFFICE VISIT (OUTPATIENT)
Dept: FAMILY MEDICINE CLINIC | Age: 34
End: 2024-03-13
Payer: COMMERCIAL

## 2024-03-13 VITALS
RESPIRATION RATE: 16 BRPM | HEART RATE: 95 BPM | TEMPERATURE: 97.8 F | HEIGHT: 63 IN | OXYGEN SATURATION: 98 % | WEIGHT: 138.8 LBS | SYSTOLIC BLOOD PRESSURE: 110 MMHG | DIASTOLIC BLOOD PRESSURE: 74 MMHG | BODY MASS INDEX: 24.59 KG/M2

## 2024-03-13 DIAGNOSIS — G43.009 MIGRAINE WITHOUT AURA AND WITHOUT STATUS MIGRAINOSUS, NOT INTRACTABLE: Primary | ICD-10-CM

## 2024-03-13 DIAGNOSIS — L02.92 BOIL: ICD-10-CM

## 2024-03-13 DIAGNOSIS — L40.50 PSORIATIC ARTHRITIS (HCC): ICD-10-CM

## 2024-03-13 PROCEDURE — 99213 OFFICE O/P EST LOW 20 MIN: CPT | Performed by: NURSE PRACTITIONER

## 2024-03-13 RX ORDER — DOXYCYCLINE HYCLATE 100 MG
100 TABLET ORAL 2 TIMES DAILY
Qty: 20 TABLET | Refills: 0 | Status: SHIPPED | OUTPATIENT
Start: 2024-03-13 | End: 2024-03-23

## 2024-03-13 SDOH — ECONOMIC STABILITY: FOOD INSECURITY: WITHIN THE PAST 12 MONTHS, THE FOOD YOU BOUGHT JUST DIDN'T LAST AND YOU DIDN'T HAVE MONEY TO GET MORE.: NEVER TRUE

## 2024-03-13 SDOH — ECONOMIC STABILITY: INCOME INSECURITY: HOW HARD IS IT FOR YOU TO PAY FOR THE VERY BASICS LIKE FOOD, HOUSING, MEDICAL CARE, AND HEATING?: NOT HARD AT ALL

## 2024-03-13 SDOH — ECONOMIC STABILITY: FOOD INSECURITY: WITHIN THE PAST 12 MONTHS, YOU WORRIED THAT YOUR FOOD WOULD RUN OUT BEFORE YOU GOT MONEY TO BUY MORE.: NEVER TRUE

## 2024-03-13 ASSESSMENT — PATIENT HEALTH QUESTIONNAIRE - PHQ9
SUM OF ALL RESPONSES TO PHQ QUESTIONS 1-9: 1
1. LITTLE INTEREST OR PLEASURE IN DOING THINGS: SEVERAL DAYS
SUM OF ALL RESPONSES TO PHQ9 QUESTIONS 1 & 2: 1
2. FEELING DOWN, DEPRESSED OR HOPELESS: NOT AT ALL

## 2024-03-13 NOTE — PATIENT INSTRUCTIONS
Thank you for choosing [facility].  We know you have options when it comes to your healthcare; we appreciate that you chose us. Our goal is to provide exceptional  service and world class care to every patient.  You will be receiving a survey via email or text message asking for your feedback.  Please take a few minutes to share your thoughts about your recent visit. Your comments helps us understand what we do well and ways we can improve.  Thank you in advance for your valuable feedback.                New Updates for [facility]    Thank you for choosing [facility] to give you the best care! [facility] is always trying to think of new ways to help their patients. We are asking all patients to try out the new digital registration that is now available through the [employer] [facility]. Down load today!. Via the yohan you're now able to update your personal and registration information prior to your upcoming appointment. This will save you time once you arrive at the office to check-in, not to mention your information remains safe!! Many other perks come from signing up for an account, such as:  Requesting refills  Scheduling an appointment  Completing an E-Visit  Sending a message to the office/provider  Having access to your medication list  Paying your bill/copay prior to your appointment  Scheduling your yearly mammogram  Review your test results    If you are not familiar the [employer] Y[facility]AN, please ask one of us and we will be happy to answer any questions or help you set-up your account.

## 2024-03-13 NOTE — PROGRESS NOTES
[facility]  [facility]  [address]  [address]  [facility] [address]  Dept: [phone]    3/13/2024    CHIEF COMPLAINT    Chief Complaint   Patient presents with    Migraine       HPI    Kaela Grier is a 33 y.o. female who presents   Chief Complaint   Patient presents with    Migraine     Appointment to f/u on Migraines.      Specialists:      Neurology - Dr Kavon Sutton ([facility])  Rheumatology- Kell Calhoun, CNP ([facility])  Dermatology- through [facility] for original diagnosis of RA.  OBGYN- Vandana Jones-David       Migraines- Seeing Dr. Kavon Sutton every 3 months.   Taking Qulipta 60 mg for daily preventative, imitrex for abortive and  Fioricet for headaches.  Some weeks she will have no migraines, but last week she had 5 migraines.   Normally she is having about 2 headache days per week on average.  Migraines are roughly 1-2 per week when not on her period and daily when she is the week before her period.   FMLA has been within good parameters for 8 days per month. May transition to neurology filling out if they not approve this amount from us.     Migraine hx- Inderal caused fatigue, nurtec worked okay, but she would still have to go to sleep to get her migraine to fully resolve; Maxalt caused nausea.  Effexor caused n/v, abdominal pain and constipation.      Psoriatic arthritis-continues following with rheumatology.   Taking Cosentyx and methotrexate.     Maternal history of cervical cancer. Patient had pap in 12/2023. This was WNL, but her uterus was enlarged. US ordered, but not completed yet.      PHQ-9 Total Score: 1 (3/13/2024  2:31 PM)    Sore on upper left thigh- worried it was a boil/pimple. She used a boil cream on it, but this just numbed it. Has tried warm compresses with little relief.   Present x 2 weeks and is getting bigger and more painful. She denies any fevers or exudate.     Vitals:    03/13/24 1426   BP: 110/74   Site: Left Upper Arm

## 2024-03-14 ENCOUNTER — PATIENT MESSAGE (OUTPATIENT)
Dept: FAMILY MEDICINE CLINIC | Age: 34
End: 2024-03-14

## 2024-03-14 NOTE — TELEPHONE ENCOUNTER
From: Kaela Grier  To: Gloria Vieyra  Sent: 3/14/2024 2:52 PM EDT  Subject: FMLA paperwork     I attached my fmla paperwork to this, let me know if it worked!   
Patient notified, forms completed, waiting on a signature  
This is a surgical and/or non-medical patient.

## 2024-03-14 NOTE — TELEPHONE ENCOUNTER
Medication Management Service    Date: 3/14/2024  Patient's Name: Kaela Grier YOB: 1990            _____________________________________________________________________________________________    Called patient to discuss PsA medications. Rheumatologist is wanting patient to hold any doses of methotrexate and Cosentyx while taking the doxycyline for the next 10 days. Patient verbalized understanding and stated that she is not due for another Cosentyx dose until 3/31. Patient to continue holding methotrexate until next dose on 3/27.     Scooby Marquez, NemesioD, [facility]  Ambulatory Clinical Pharmacist   [facility] Specialty Medication Service  Phone: [phone]  [facility]  Phone: [phone] option 1    For Pharmacy Admin Tracking Only    Program: Medical Group  CPA in place:  No  Recommendation Provided To: Provider: 1 via Note to Provider  Intervention Detail: Referral to Other Provider  Intervention Accepted By: Provider: 1  Gap Closed?: Yes   Time Spent (min): 45

## 2024-03-14 NOTE — TELEPHONE ENCOUNTER
Medication Management Service    Date: 3/14/2024  Patient's Name: Kaela Grier YOB: 1990            _____________________________________________________________________________________________    Called Kell Younger's office ([facility] Rheumatology) to discuss holding Cosentyx and methotrexate while on doxycycline for 10 days.  said likely will have patient hold but will have to discuss with specialist. Office to call [facility] to determine if needed to hold both medications.    Scooby Marquez, NemesioD, [facility]  Ambulatory Clinical Pharmacist   [facility] Specialty Medication Service  Phone: [phone]  [facility] Medicine  Phone: [phone] option 1

## 2024-03-14 NOTE — TELEPHONE ENCOUNTER
From: Gloria Vieyra  To: Kaela Grier  Sent: 3/13/2024 6:49 PM EDT  Subject: Doxcycline and Methotrexate Question    Kaela,     I'm still working through how to help direct you on the doxycycline. When did you last take the methotrexate? Scooby needs to know this to help me help you.    Take Care & Stay Safe,  Gloria

## 2024-07-11 ENCOUNTER — OFFICE VISIT (OUTPATIENT)
Dept: FAMILY MEDICINE CLINIC | Age: 34
End: 2024-07-11
Payer: COMMERCIAL

## 2024-07-11 VITALS
HEART RATE: 78 BPM | TEMPERATURE: 98 F | OXYGEN SATURATION: 97 % | DIASTOLIC BLOOD PRESSURE: 74 MMHG | WEIGHT: 139.2 LBS | BODY MASS INDEX: 24.66 KG/M2 | RESPIRATION RATE: 16 BRPM | SYSTOLIC BLOOD PRESSURE: 112 MMHG | HEIGHT: 63 IN

## 2024-07-11 DIAGNOSIS — G43.009 MIGRAINE WITHOUT AURA AND WITHOUT STATUS MIGRAINOSUS, NOT INTRACTABLE: Primary | ICD-10-CM

## 2024-07-11 DIAGNOSIS — L40.50 PSORIATIC ARTHRITIS (HCC): ICD-10-CM

## 2024-07-11 PROCEDURE — 99213 OFFICE O/P EST LOW 20 MIN: CPT | Performed by: NURSE PRACTITIONER

## 2024-07-11 ASSESSMENT — PATIENT HEALTH QUESTIONNAIRE - PHQ9
SUM OF ALL RESPONSES TO PHQ QUESTIONS 1-9: 0
SUM OF ALL RESPONSES TO PHQ QUESTIONS 1-9: 0
SUM OF ALL RESPONSES TO PHQ9 QUESTIONS 1 & 2: 0
SUM OF ALL RESPONSES TO PHQ QUESTIONS 1-9: 0
SUM OF ALL RESPONSES TO PHQ QUESTIONS 1-9: 0
1. LITTLE INTEREST OR PLEASURE IN DOING THINGS: NOT AT ALL
2. FEELING DOWN, DEPRESSED OR HOPELESS: NOT AT ALL

## 2024-07-11 ASSESSMENT — ENCOUNTER SYMPTOMS
PHOTOPHOBIA: 1
GASTROINTESTINAL NEGATIVE: 1
ABDOMINAL PAIN: 0
NAUSEA: 0
CONSTIPATION: 0
RESPIRATORY NEGATIVE: 1
VOMITING: 0
DIARRHEA: 0
COUGH: 0
SHORTNESS OF BREATH: 0

## 2024-07-11 NOTE — PROGRESS NOTES
Height: 1.6 m (5' 3\")       Wt Readings from Last 3 Encounters:   07/11/24 63.1 kg (139 lb 3.2 oz)   03/13/24 63 kg (138 lb 12.8 oz)   12/12/23 64.4 kg (142 lb)     BP Readings from Last 3 Encounters:   07/11/24 112/74   03/13/24 110/74   12/12/23 105/75       REVIEW OF SYSTEMS    Review of Systems   Constitutional:  Positive for appetite change (Improved since stress has leveled out) and fatigue. Negative for chills and fever.   Eyes:  Positive for photophobia (with migraines).   Respiratory: Negative.  Negative for cough and shortness of breath.    Cardiovascular: Negative.  Negative for chest pain and palpitations.   Gastrointestinal: Negative.  Negative for abdominal pain, constipation, diarrhea, nausea and vomiting.   Genitourinary: Negative.    Musculoskeletal: Negative.    Neurological:  Positive for headaches. Negative for dizziness.   Psychiatric/Behavioral:  Positive for sleep disturbance. Negative for dysphoric mood. The patient is not nervous/anxious.        PAST MEDICAL HISTORY    Past Medical History:   Diagnosis Date    COVID-19 1/19/2022    Migraine without aura and without status migrainosus, not intractable 4/5/2021    Psoriasis 4/5/2021    Psoriatic arthritis (HCC) 4/5/2021       FAMILY HISTORY    Family History   Problem Relation Age of Onset    Cervical Cancer Mother     Anxiety Disorder Mother     Psoriasis Mother     Diabetes type 2  Father     Migraines Father     High Cholesterol Father     Migraines Sister     Psoriasis Sister     Anxiety Disorder Sister     No Known Problems Brother     Migraines Maternal Grandmother     Heart Surgery Maternal Grandmother         heart valve replacement     Diabetes Maternal Grandmother     Kidney Disease Maternal Grandmother         on HD     Other Maternal Grandmother         DVT in arms     Heart Attack Maternal Grandfather 72        COD     No Known Problems Paternal Grandmother         unknown health history    No Known Problems Paternal Grandfather

## 2024-07-11 NOTE — PATIENT INSTRUCTIONS
Thank you for choosing [facility].  We know you have options when it comes to your healthcare; we appreciate that you chose us. Our goal is to provide exceptional  service and world class care to every patient.  You will be receiving a survey via email or text message asking for your feedback.  Please take a few minutes to share your thoughts about your recent visit. Your comments helps us understand what we do well and ways we can improve.  Thank you in advance for your valuable feedback.                New Updates for [facility] YOHAN    Thank you for choosing [facility] to give you the best care! [facility] is always trying to think of new ways to help their patients. We are asking all patients to try out the new digital registration that is now available through the [facility] Y[facility]. Down load today!. Via the yohan you're now able to update your personal and registration information prior to your upcoming appointment. This will save you time once you arrive at the office to check-in, not to mention your information remains safe!! Many other perks come from signing up for an account, such as:  Requesting refills  Scheduling an appointment  Completing an E-Visit  Sending a message to the office/provider  Having access to your medication list  Paying your bill/copay prior to your appointment  Scheduling your yearly mammogram  Review your test results    If you are not familiar the [facility] YOHAN, please ask one of us and we will be happy to answer any questions or help you set-up your account.

## 2024-08-05 ENCOUNTER — PATIENT MESSAGE (OUTPATIENT)
Dept: FAMILY MEDICINE CLINIC | Age: 34
End: 2024-08-05

## 2024-08-05 DIAGNOSIS — G43.009 MIGRAINE WITHOUT AURA AND WITHOUT STATUS MIGRAINOSUS, NOT INTRACTABLE: Primary | ICD-10-CM

## 2024-08-05 RX ORDER — ATOGEPANT 60 MG/1
1 TABLET ORAL DAILY
Qty: 16 TABLET | Refills: 0 | Status: SHIPPED | COMMUNITY
Start: 2024-08-05

## 2024-08-05 NOTE — TELEPHONE ENCOUNTER
From: Kaela Grier  To: Gloria Vieyra  Sent: 8/5/2024 1:05 PM EDT  Subject: Qulipta    Hey I’ve been without my qulipta for almost 2 weeks now. I was wondering if you guys had any samples of that that I could come ?

## 2024-08-05 NOTE — TELEPHONE ENCOUNTER
We do have samples, her Neurologist ordered it. She left messages at that office also.     She takes 60 mg: Take 1 tablet daily

## 2025-01-22 ENCOUNTER — PATIENT MESSAGE (OUTPATIENT)
Dept: FAMILY MEDICINE CLINIC | Age: 35
End: 2025-01-22

## 2025-01-22 ENCOUNTER — OFFICE VISIT (OUTPATIENT)
Dept: FAMILY MEDICINE CLINIC | Age: 35
End: 2025-01-22
Payer: COMMERCIAL

## 2025-01-22 VITALS
HEART RATE: 75 BPM | BODY MASS INDEX: 25.16 KG/M2 | HEIGHT: 63 IN | DIASTOLIC BLOOD PRESSURE: 72 MMHG | OXYGEN SATURATION: 99 % | RESPIRATION RATE: 16 BRPM | SYSTOLIC BLOOD PRESSURE: 108 MMHG | WEIGHT: 142 LBS | TEMPERATURE: 97.8 F

## 2025-01-22 DIAGNOSIS — Z80.49 FAMILY HISTORY OF CERVICAL CANCER: ICD-10-CM

## 2025-01-22 DIAGNOSIS — G43.009 MIGRAINE WITHOUT AURA AND WITHOUT STATUS MIGRAINOSUS, NOT INTRACTABLE: Primary | ICD-10-CM

## 2025-01-22 DIAGNOSIS — L40.50 PSORIATIC ARTHRITIS (HCC): ICD-10-CM

## 2025-01-22 PROCEDURE — 99213 OFFICE O/P EST LOW 20 MIN: CPT | Performed by: NURSE PRACTITIONER

## 2025-01-22 PROCEDURE — G8419 CALC BMI OUT NRM PARAM NOF/U: HCPCS | Performed by: NURSE PRACTITIONER

## 2025-01-22 PROCEDURE — 1036F TOBACCO NON-USER: CPT | Performed by: NURSE PRACTITIONER

## 2025-01-22 PROCEDURE — G8427 DOCREV CUR MEDS BY ELIG CLIN: HCPCS | Performed by: NURSE PRACTITIONER

## 2025-01-22 SDOH — ECONOMIC STABILITY: FOOD INSECURITY: WITHIN THE PAST 12 MONTHS, THE FOOD YOU BOUGHT JUST DIDN'T LAST AND YOU DIDN'T HAVE MONEY TO GET MORE.: NEVER TRUE

## 2025-01-22 SDOH — ECONOMIC STABILITY: FOOD INSECURITY: WITHIN THE PAST 12 MONTHS, YOU WORRIED THAT YOUR FOOD WOULD RUN OUT BEFORE YOU GOT MONEY TO BUY MORE.: NEVER TRUE

## 2025-01-22 SDOH — ECONOMIC STABILITY: INCOME INSECURITY: IN THE LAST 12 MONTHS, WAS THERE A TIME WHEN YOU WERE NOT ABLE TO PAY THE MORTGAGE OR RENT ON TIME?: NO

## 2025-01-22 ASSESSMENT — ENCOUNTER SYMPTOMS
GASTROINTESTINAL NEGATIVE: 1
DIARRHEA: 0
NAUSEA: 0
COUGH: 0
ABDOMINAL PAIN: 0
CONSTIPATION: 0
RESPIRATORY NEGATIVE: 1
VOMITING: 0
SHORTNESS OF BREATH: 0
PHOTOPHOBIA: 1

## 2025-01-22 ASSESSMENT — PATIENT HEALTH QUESTIONNAIRE - PHQ9
SUM OF ALL RESPONSES TO PHQ QUESTIONS 1-9: 0
1. LITTLE INTEREST OR PLEASURE IN DOING THINGS: NOT AT ALL
SUM OF ALL RESPONSES TO PHQ QUESTIONS 1-9: 0
SUM OF ALL RESPONSES TO PHQ9 QUESTIONS 1 & 2: 0
2. FEELING DOWN, DEPRESSED OR HOPELESS: NOT AT ALL
SUM OF ALL RESPONSES TO PHQ QUESTIONS 1-9: 0
1. LITTLE INTEREST OR PLEASURE IN DOING THINGS: NOT AT ALL
2. FEELING DOWN, DEPRESSED OR HOPELESS: NOT AT ALL
SUM OF ALL RESPONSES TO PHQ QUESTIONS 1-9: 0
SUM OF ALL RESPONSES TO PHQ9 QUESTIONS 1 & 2: 0

## 2025-01-22 NOTE — PROGRESS NOTES
PHQ Scores   PHQ2 Score 0 0 1 0 0 0 0   PHQ9 Score 0 0 1 0 0 6 0     Interpretation of Total Score Depression Severity: 1-4 = Minimal depression, 5-9 = Mild depression, 10-14 = Moderate depression, 15-19 = Moderately severe depression, 20-27 = Severe depression     Return in about 4 months (around 5/22/2025) for migraines.    (Please note that portions of this note were completed with a voice recognition program. Efforts were made to edit the dictations but occasionally words are mis-transcribed.)      Electronically signed by DEMETRA Penn CNP on 1/22/25 at 4:06 PM EST

## 2025-03-17 ENCOUNTER — PATIENT MESSAGE (OUTPATIENT)
Dept: FAMILY MEDICINE CLINIC | Age: 35
End: 2025-03-17

## 2025-05-21 ENCOUNTER — OFFICE VISIT (OUTPATIENT)
Dept: FAMILY MEDICINE CLINIC | Age: 35
End: 2025-05-21
Payer: COMMERCIAL

## 2025-05-21 VITALS
SYSTOLIC BLOOD PRESSURE: 122 MMHG | WEIGHT: 139 LBS | BODY MASS INDEX: 24.63 KG/M2 | HEIGHT: 63 IN | DIASTOLIC BLOOD PRESSURE: 80 MMHG

## 2025-05-21 DIAGNOSIS — Z80.49 FAMILY HISTORY OF CERVICAL CANCER: ICD-10-CM

## 2025-05-21 DIAGNOSIS — L40.50 PSORIATIC ARTHRITIS (HCC): ICD-10-CM

## 2025-05-21 DIAGNOSIS — G43.009 MIGRAINE WITHOUT AURA AND WITHOUT STATUS MIGRAINOSUS, NOT INTRACTABLE: Primary | ICD-10-CM

## 2025-05-21 PROCEDURE — G8420 CALC BMI NORM PARAMETERS: HCPCS | Performed by: NURSE PRACTITIONER

## 2025-05-21 PROCEDURE — G8427 DOCREV CUR MEDS BY ELIG CLIN: HCPCS | Performed by: NURSE PRACTITIONER

## 2025-05-21 PROCEDURE — 99213 OFFICE O/P EST LOW 20 MIN: CPT | Performed by: NURSE PRACTITIONER

## 2025-05-21 PROCEDURE — 1036F TOBACCO NON-USER: CPT | Performed by: NURSE PRACTITIONER

## 2025-05-21 NOTE — PROGRESS NOTES
[facility]  [facility] MEDICINE  [address]  [address]  [facility] [address]  Dept: [phone]    5/21/2025    CHIEF COMPLAINT    Chief Complaint   Patient presents with    Migraine       HPI    Kaela Grier is a 35 y.o. female who presents   Chief Complaint   Patient presents with    Migraine     Specialists:      Neurology - Dr Kavon Sutton ([facility])  Rheumatology- Kell Calhoun CNP ([facility])  Dermatology- through  for original diagnosis of RA.  OBGYN- Vandana Alcazar    History of Present Illness    The patient presents for evaluation of migraines and psoriatic arthritis.    She reports a fluctuating pattern of migraines, with the most recent episode occurring on 05/15/2025. She experienced daily migraines the previous week but has been symptom-free since then.   The severity of her migraines varies, with some episodes being more tolerable than others. She occasionally experiences a cluster of migraines, which she finds challenging to manage. She attributes these episodes to changes in weather and work-related stress. She is currently on Qulipta as a prophylactic measure, Imitrex for abortive treatment, and occasionally uses Fioricet. She has not made any recent changes to her medication regimen. She also mentions that the Family and Medical Leave Act (FMLA) has been beneficial in managing her condition.    She continues her treatment with Cosentyx for psoriatic arthritis, which appears to be maintaining her condition at a stable level.    She has not yet undergone the transvaginal ultrasound as ordered by OBGYN for her enlarged uterus, but plans to schedule one after the school term ends.  She will call OBGYN for new orders.    Vitals:    05/21/25 1327   BP: 122/80   BP Site: Left Upper Arm   Patient Position: Sitting   BP Cuff Size: Medium Adult   Weight: 63 kg (139 lb)   Height: 1.6 m (5' 3\")       Wt Readings from Last 3 Encounters:   05/21/25 63 kg (139

## 2025-08-25 ENCOUNTER — TELEPHONE (OUTPATIENT)
Age: 35
End: 2025-08-25

## 2025-08-25 ENCOUNTER — TELEMEDICINE ON DEMAND (OUTPATIENT)
Age: 35
End: 2025-08-25
Payer: COMMERCIAL

## 2025-08-25 ENCOUNTER — HOSPITAL ENCOUNTER (OUTPATIENT)
Age: 35
Setting detail: SPECIMEN
Discharge: HOME | End: 2025-08-25
Payer: COMMERCIAL

## 2025-08-25 DIAGNOSIS — J02.9 PHARYNGITIS, UNSPECIFIED ETIOLOGY: Primary | ICD-10-CM

## 2025-08-25 DIAGNOSIS — R05.1 ACUTE COUGH: ICD-10-CM

## 2025-08-25 LAB
SPECIMEN SOURCE: NORMAL
STREP A, MOLECULAR: NEGATIVE

## 2025-08-25 PROCEDURE — 99213 OFFICE O/P EST LOW 20 MIN: CPT | Performed by: NURSE PRACTITIONER

## 2025-08-25 PROCEDURE — 1036F TOBACCO NON-USER: CPT | Performed by: NURSE PRACTITIONER

## 2025-08-25 PROCEDURE — G8427 DOCREV CUR MEDS BY ELIG CLIN: HCPCS | Performed by: NURSE PRACTITIONER

## 2025-08-25 PROCEDURE — G8420 CALC BMI NORM PARAMETERS: HCPCS | Performed by: NURSE PRACTITIONER

## 2025-08-25 PROCEDURE — 87651 STREP A DNA AMP PROBE: CPT

## 2025-08-25 RX ORDER — BENZONATATE 100 MG/1
100 CAPSULE ORAL 3 TIMES DAILY PRN
Qty: 30 CAPSULE | Refills: 0 | Status: SHIPPED | OUTPATIENT
Start: 2025-08-25 | End: 2025-09-04

## 2025-08-25 RX ORDER — LIDOCAINE HYDROCHLORIDE 20 MG/ML
15 SOLUTION OROPHARYNGEAL PRN
Qty: 100 ML | Refills: 0 | Status: SHIPPED | OUTPATIENT
Start: 2025-08-25

## 2025-08-25 ASSESSMENT — ENCOUNTER SYMPTOMS
SORE THROAT: 1
COUGH: 1
VOICE CHANGE: 1